# Patient Record
Sex: MALE | Race: BLACK OR AFRICAN AMERICAN | NOT HISPANIC OR LATINO | Employment: UNEMPLOYED | URBAN - METROPOLITAN AREA
[De-identification: names, ages, dates, MRNs, and addresses within clinical notes are randomized per-mention and may not be internally consistent; named-entity substitution may affect disease eponyms.]

---

## 2022-04-09 ENCOUNTER — HISTORICAL (OUTPATIENT)
Dept: ADMINISTRATIVE | Facility: HOSPITAL | Age: 22
End: 2022-04-09

## 2022-04-27 ENCOUNTER — HISTORICAL (OUTPATIENT)
Dept: ADMINISTRATIVE | Facility: HOSPITAL | Age: 22
End: 2022-04-27

## 2022-04-27 VITALS — SYSTOLIC BLOOD PRESSURE: 111 MMHG | DIASTOLIC BLOOD PRESSURE: 68 MMHG

## 2023-04-22 ENCOUNTER — HOSPITAL ENCOUNTER (EMERGENCY)
Facility: HOSPITAL | Age: 23
Discharge: HOME | End: 2023-04-22
Attending: INTERNAL MEDICINE
Payer: MEDICAID

## 2023-04-22 VITALS
RESPIRATION RATE: 15 BRPM | OXYGEN SATURATION: 99 % | SYSTOLIC BLOOD PRESSURE: 106 MMHG | WEIGHT: 160 LBS | BODY MASS INDEX: 21.67 KG/M2 | HEART RATE: 68 BPM | HEIGHT: 72 IN | TEMPERATURE: 98 F | DIASTOLIC BLOOD PRESSURE: 69 MMHG

## 2023-04-22 DIAGNOSIS — R07.89 ATYPICAL CHEST PAIN: ICD-10-CM

## 2023-04-22 DIAGNOSIS — R10.13 EPIGASTRIC ABDOMINAL PAIN: Primary | ICD-10-CM

## 2023-04-22 DIAGNOSIS — R07.9 CHEST PAIN: ICD-10-CM

## 2023-04-22 LAB
ALBUMIN SERPL-MCNC: 4.7 G/DL (ref 3.5–5)
ALBUMIN/GLOB SERPL: 1.6 RATIO (ref 1.1–2)
ALP SERPL-CCNC: 62 UNIT/L (ref 40–150)
ALT SERPL-CCNC: 16 UNIT/L (ref 0–55)
AMYLASE SERPL-CCNC: 97 UNIT/L (ref 25–125)
APPEARANCE UR: CLEAR
AST SERPL-CCNC: 22 UNIT/L (ref 5–34)
BACTERIA #/AREA URNS AUTO: ABNORMAL /HPF
BASOPHILS # BLD AUTO: 0.04 X10(3)/MCL (ref 0–0.2)
BASOPHILS NFR BLD AUTO: 0.2 %
BILIRUB UR QL STRIP.AUTO: NEGATIVE MG/DL
BILIRUBIN DIRECT+TOT PNL SERPL-MCNC: 1.3 MG/DL
BUN SERPL-MCNC: 11.8 MG/DL (ref 8.9–20.6)
CALCIUM SERPL-MCNC: 9.9 MG/DL (ref 8.4–10.2)
CHLORIDE SERPL-SCNC: 106 MMOL/L (ref 98–107)
CO2 SERPL-SCNC: 22 MMOL/L (ref 22–29)
COLOR UR AUTO: COLORLESS
CREAT SERPL-MCNC: 0.87 MG/DL (ref 0.73–1.18)
D DIMER PPP IA.FEU-MCNC: <0.27 UG/ML FEU (ref 0–0.5)
EOSINOPHIL # BLD AUTO: 0 X10(3)/MCL (ref 0–0.9)
EOSINOPHIL NFR BLD AUTO: 0 %
ERYTHROCYTE [DISTWIDTH] IN BLOOD BY AUTOMATED COUNT: 11.5 % (ref 11.5–17)
GFR SERPLBLD CREATININE-BSD FMLA CKD-EPI: >60 MLS/MIN/1.73/M2
GLOBULIN SER-MCNC: 2.9 GM/DL (ref 2.4–3.5)
GLUCOSE SERPL-MCNC: 135 MG/DL (ref 74–100)
GLUCOSE UR QL STRIP.AUTO: NORMAL MG/DL
HCT VFR BLD AUTO: 39.8 % (ref 42–52)
HGB BLD-MCNC: 14.7 G/DL (ref 14–18)
HYALINE CASTS #/AREA URNS LPF: ABNORMAL /LPF
IMM GRANULOCYTES # BLD AUTO: 0.14 X10(3)/MCL (ref 0–0.04)
IMM GRANULOCYTES NFR BLD AUTO: 0.7 %
KETONES UR QL STRIP.AUTO: ABNORMAL MG/DL
LEUKOCYTE ESTERASE UR QL STRIP.AUTO: NEGATIVE UNIT/L
LIPASE SERPL-CCNC: 10 U/L
LYMPHOCYTES # BLD AUTO: 0.99 X10(3)/MCL (ref 0.6–4.6)
LYMPHOCYTES NFR BLD AUTO: 4.8 %
MCH RBC QN AUTO: 32.2 PG (ref 27–31)
MCHC RBC AUTO-ENTMCNC: 36.9 G/DL (ref 33–36)
MCV RBC AUTO: 87.3 FL (ref 80–94)
MONOCYTES # BLD AUTO: 1.08 X10(3)/MCL (ref 0.1–1.3)
MONOCYTES NFR BLD AUTO: 5.3 %
MUCOUS THREADS URNS QL MICRO: ABNORMAL /LPF
NEUTROPHILS # BLD AUTO: 18.28 X10(3)/MCL (ref 2.1–9.2)
NEUTROPHILS NFR BLD AUTO: 89 %
NITRITE UR QL STRIP.AUTO: NEGATIVE
NRBC BLD AUTO-RTO: 0 %
PH UR STRIP.AUTO: 8.5 [PH]
PLATELET # BLD AUTO: 238 X10(3)/MCL (ref 130–400)
PMV BLD AUTO: 9.7 FL (ref 7.4–10.4)
POTASSIUM SERPL-SCNC: 4.2 MMOL/L (ref 3.5–5.1)
PROT SERPL-MCNC: 7.6 GM/DL (ref 6.4–8.3)
PROT UR QL STRIP.AUTO: ABNORMAL MG/DL
RBC # BLD AUTO: 4.56 X10(6)/MCL (ref 4.7–6.1)
RBC #/AREA URNS AUTO: ABNORMAL /HPF
RBC UR QL AUTO: NEGATIVE UNIT/L
SODIUM SERPL-SCNC: 140 MMOL/L (ref 136–145)
SP GR UR STRIP.AUTO: >1.05
SQUAMOUS #/AREA URNS LPF: ABNORMAL /HPF
TROPONIN I SERPL-MCNC: <0.01 NG/ML (ref 0–0.04)
TROPONIN I SERPL-MCNC: <0.01 NG/ML (ref 0–0.04)
UROBILINOGEN UR STRIP-ACNC: NORMAL MG/DL
WBC # SPEC AUTO: 20.5 X10(3)/MCL (ref 4.5–11.5)
WBC #/AREA URNS AUTO: ABNORMAL /HPF

## 2023-04-22 PROCEDURE — 85610 PROTHROMBIN TIME: CPT | Performed by: PHYSICIAN ASSISTANT

## 2023-04-22 PROCEDURE — 63600175 PHARM REV CODE 636 W HCPCS: Performed by: PHYSICIAN ASSISTANT

## 2023-04-22 PROCEDURE — 80053 COMPREHEN METABOLIC PANEL: CPT | Performed by: PHYSICIAN ASSISTANT

## 2023-04-22 PROCEDURE — 85025 COMPLETE CBC W/AUTO DIFF WBC: CPT | Performed by: PHYSICIAN ASSISTANT

## 2023-04-22 PROCEDURE — 25000003 PHARM REV CODE 250: Performed by: PHYSICIAN ASSISTANT

## 2023-04-22 PROCEDURE — 96361 HYDRATE IV INFUSION ADD-ON: CPT

## 2023-04-22 PROCEDURE — 25500020 PHARM REV CODE 255

## 2023-04-22 PROCEDURE — 84484 ASSAY OF TROPONIN QUANT: CPT | Performed by: PHYSICIAN ASSISTANT

## 2023-04-22 PROCEDURE — 96374 THER/PROPH/DIAG INJ IV PUSH: CPT | Mod: 59

## 2023-04-22 PROCEDURE — 93005 ELECTROCARDIOGRAM TRACING: CPT

## 2023-04-22 PROCEDURE — 99285 EMERGENCY DEPT VISIT HI MDM: CPT | Mod: 25

## 2023-04-22 PROCEDURE — 96375 TX/PRO/DX INJ NEW DRUG ADDON: CPT

## 2023-04-22 PROCEDURE — 81001 URINALYSIS AUTO W/SCOPE: CPT | Performed by: PHYSICIAN ASSISTANT

## 2023-04-22 PROCEDURE — 85379 FIBRIN DEGRADATION QUANT: CPT | Performed by: PHYSICIAN ASSISTANT

## 2023-04-22 PROCEDURE — 83690 ASSAY OF LIPASE: CPT | Performed by: PHYSICIAN ASSISTANT

## 2023-04-22 PROCEDURE — 82150 ASSAY OF AMYLASE: CPT | Performed by: PHYSICIAN ASSISTANT

## 2023-04-22 RX ORDER — ONDANSETRON 4 MG/1
4 TABLET, ORALLY DISINTEGRATING ORAL EVERY 8 HOURS PRN
Qty: 9 TABLET | Refills: 0 | Status: SHIPPED | OUTPATIENT
Start: 2023-04-22 | End: 2023-04-25

## 2023-04-22 RX ORDER — MORPHINE SULFATE 2 MG/ML
6 INJECTION, SOLUTION INTRAMUSCULAR; INTRAVENOUS
Status: COMPLETED | OUTPATIENT
Start: 2023-04-22 | End: 2023-04-22

## 2023-04-22 RX ORDER — KETOROLAC TROMETHAMINE 30 MG/ML
15 INJECTION, SOLUTION INTRAMUSCULAR; INTRAVENOUS
Status: COMPLETED | OUTPATIENT
Start: 2023-04-22 | End: 2023-04-22

## 2023-04-22 RX ORDER — METOCLOPRAMIDE HYDROCHLORIDE 5 MG/ML
10 INJECTION INTRAMUSCULAR; INTRAVENOUS
Status: COMPLETED | OUTPATIENT
Start: 2023-04-22 | End: 2023-04-22

## 2023-04-22 RX ORDER — ONDANSETRON 2 MG/ML
4 INJECTION INTRAMUSCULAR; INTRAVENOUS
Status: COMPLETED | OUTPATIENT
Start: 2023-04-22 | End: 2023-04-22

## 2023-04-22 RX ADMIN — MORPHINE SULFATE 6 MG: 2 INJECTION, SOLUTION INTRAMUSCULAR; INTRAVENOUS at 04:04

## 2023-04-22 RX ADMIN — KETOROLAC TROMETHAMINE 15 MG: 30 INJECTION, SOLUTION INTRAMUSCULAR; INTRAVENOUS at 03:04

## 2023-04-22 RX ADMIN — ONDANSETRON 4 MG: 2 INJECTION INTRAMUSCULAR; INTRAVENOUS at 03:04

## 2023-04-22 RX ADMIN — IOHEXOL 100 ML: 350 INJECTION, SOLUTION INTRAVENOUS at 04:04

## 2023-04-22 RX ADMIN — SODIUM CHLORIDE 1000 ML: 9 INJECTION, SOLUTION INTRAVENOUS at 03:04

## 2023-04-22 RX ADMIN — METOCLOPRAMIDE 10 MG: 5 INJECTION, SOLUTION INTRAMUSCULAR; INTRAVENOUS at 04:04

## 2023-04-22 RX ADMIN — SODIUM CHLORIDE 1000 ML: 9 INJECTION, SOLUTION INTRAVENOUS at 06:04
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
## 2023-06-13 ENCOUNTER — HOSPITAL ENCOUNTER (EMERGENCY)
Facility: HOSPITAL | Age: 23
Discharge: HOME | End: 2023-06-13
Attending: INTERNAL MEDICINE
Payer: MEDICAID

## 2023-06-13 VITALS
OXYGEN SATURATION: 99 % | BODY MASS INDEX: 20.16 KG/M2 | HEART RATE: 111 BPM | SYSTOLIC BLOOD PRESSURE: 138 MMHG | TEMPERATURE: 101 F | WEIGHT: 152.13 LBS | HEIGHT: 73 IN | RESPIRATION RATE: 16 BRPM | DIASTOLIC BLOOD PRESSURE: 73 MMHG

## 2023-06-13 DIAGNOSIS — J03.90 TONSILLITIS: Primary | ICD-10-CM

## 2023-06-13 LAB — STREP A PCR (OHS): NOT DETECTED

## 2023-06-13 PROCEDURE — 99284 EMERGENCY DEPT VISIT MOD MDM: CPT

## 2023-06-13 PROCEDURE — 87651 STREP A DNA AMP PROBE: CPT | Performed by: NURSE PRACTITIONER

## 2023-06-13 PROCEDURE — 25000003 PHARM REV CODE 250: Performed by: NURSE PRACTITIONER

## 2023-06-13 RX ORDER — DICLOFENAC SODIUM 75 MG/1
75 TABLET, DELAYED RELEASE ORAL 2 TIMES DAILY
Qty: 14 TABLET | Refills: 0 | Status: SHIPPED | OUTPATIENT
Start: 2023-06-13 | End: 2023-06-20

## 2023-06-13 RX ORDER — AMOXICILLIN 500 MG/1
1000 CAPSULE ORAL DAILY
Qty: 20 CAPSULE | Refills: 0 | Status: SHIPPED | OUTPATIENT
Start: 2023-06-13 | End: 2023-06-23

## 2023-06-13 RX ORDER — ACETAMINOPHEN 500 MG
1000 TABLET ORAL
Status: COMPLETED | OUTPATIENT
Start: 2023-06-13 | End: 2023-06-13

## 2023-06-13 RX ADMIN — ACETAMINOPHEN 1000 MG: 500 TABLET, FILM COATED ORAL at 05:06

## 2023-06-13 NOTE — DISCHARGE INSTRUCTIONS
Take tylenol or motrin every 6-8 hours for fever of 101 or greater.  Take medication as prescribed.  Follow up with your primary care physician in 3-5 days for follow up evaluation.  Warm salt water gargle 3 times daily for symptoms relief.

## 2023-06-13 NOTE — ED PROVIDER NOTES
Encounter Date: 6/13/2023       History     Chief Complaint   Patient presents with    Sore Throat     SORE THROAT W FEVER X 3 DAYS.       Pt is a 22 y.o. male who presents to the [facility] ED complaining of a sore throat and fever. Symptoms x 3 days. Denies chest pain, SOB, weakness, dizziness, abdominal pain, or loss of bowel or bladder control. Denies chronic medical conditions.     Review of patient's allergies indicates:  No Known Allergies  History reviewed. No pertinent past medical history.  History reviewed. No pertinent surgical history.  History reviewed. No pertinent family history.  Social History     Tobacco Use    Smoking status: Every Day     Types: Vaping with nicotine    Smokeless tobacco: Never   Substance Use Topics    Drug use: Yes     Types: Marijuana     Review of Systems   Constitutional:  Positive for fever. Negative for chills, diaphoresis and fatigue.   HENT:  Positive for sore throat. Negative for facial swelling, postnasal drip, rhinorrhea, sinus pressure, sinus pain and trouble swallowing.    Respiratory:  Negative for cough, chest tightness, shortness of breath and wheezing.    Cardiovascular:  Negative for chest pain, palpitations and leg swelling.   Gastrointestinal:  Negative for abdominal pain, diarrhea, nausea and vomiting.   Genitourinary:  Negative for dysuria, flank pain, hematuria and urgency.   Musculoskeletal:  Negative for arthralgias, back pain and myalgias.   Skin:  Negative for color change and rash.   Neurological:  Negative for dizziness, syncope, weakness and headaches.   Hematological:  Does not bruise/bleed easily.   All other systems reviewed and are negative.    Physical Exam     Initial Vitals [06/13/23 1723]   BP Pulse Resp Temp SpO2   138/73 (!) 111 16 (!) 101.3 °F (38.5 °C) 99 %      MAP       --         Physical Exam    Nursing note and vitals reviewed.  Constitutional: Vital signs are normal. He appears well-developed and well-nourished.   HENT:   Head:  Normocephalic.   Nose: Nose normal.   Mouth/Throat: Oropharyngeal exudate, posterior oropharyngeal edema and posterior oropharyngeal erythema present. No tonsillar abscesses.   Eyes: Conjunctivae and EOM are normal. Pupils are equal, round, and reactive to light.   Neck: Neck supple.   Normal range of motion.  Cardiovascular:  Normal rate, regular rhythm, normal heart sounds and intact distal pulses.           Pulmonary/Chest: Effort normal and breath sounds normal. No respiratory distress. He has no wheezes. He has no rhonchi. He has no rales. He exhibits no tenderness.   Abdominal: Abdomen is soft and flat. Bowel sounds are normal. There is no abdominal tenderness. There is no rebound, no guarding, no tenderness at McBurney's point and negative Mahan's sign.   Musculoskeletal:         General: Normal range of motion.      Cervical back: Normal range of motion and neck supple.     Neurological: He is alert and oriented to person, place, and time. He has normal strength.   Skin: Skin is warm and dry. Capillary refill takes less than 2 seconds.   Psychiatric: He has a normal mood and affect. His behavior is normal. Judgment and thought content normal.       ED Course   Procedures  Labs Reviewed   STREP GROUP A BY PCR          Imaging Results    None          Medications   acetaminophen tablet 1,000 mg (1,000 mg Oral Given 6/13/23 1738)     Medical Decision Making:   Differential Diagnosis:   Tonsillitis  Strep pharyngitis    Clinical Tests:   Lab Tests: Ordered  ED Management:  5:42 PM Due to pt symptoms, I have a high suspicion of strep pharyngitis vs tonsillitis. I will place pt on medication coverage for suspected infection. Stressed need for him to take tylenol or motrin every 6-8 hours for fever control. He also needs to follow up with his PCP for further evaluation. Pt strep results will be available on [employer] system which I recommended that he check later today to verify result.                           Clinical Impression:   Final diagnoses:  [J03.90] Tonsillitis (Primary)        ED Disposition Condition    Discharge Stable          ED Prescriptions       Medication Sig Dispense Start Date End Date Auth. Provider    diclofenac (VOLTAREN) 75 MG EC tablet Take 1 tablet (75 mg total) by mouth 2 (two) times daily. for 7 days 14 tablet 6/13/2023 6/20/2023 HALLIE Hobson Jr.    amoxicillin (AMOXIL) 500 MG capsule Take 2 capsules (1,000 mg total) by mouth once daily. for 10 days 20 capsule 6/13/2023 6/23/2023 HALLIE Hobson Jr.          Follow-up Information       Follow up With Specialties Details Why Contact Info    [facility] Dept Emergency Medicine In 3 days As needed, If symptoms worsen [address] [facility] [address] [phone]             HALLIE Hobson Jr.  06/13/23 [id number]

## 2023-08-16 LAB
INR PPP: 1.1
PROTHROMBIN TIME: 13.9 SECONDS (ref 11.4–14)

## 2023-10-04 ENCOUNTER — HOSPITAL ENCOUNTER (EMERGENCY)
Facility: HOSPITAL | Age: 23
Discharge: HOME | End: 2023-10-04
Attending: STUDENT IN AN ORGANIZED HEALTH CARE EDUCATION/TRAINING PROGRAM
Payer: MEDICAID

## 2023-10-04 VITALS
WEIGHT: 156.06 LBS | HEART RATE: 99 BPM | OXYGEN SATURATION: 99 % | BODY MASS INDEX: 21.14 KG/M2 | HEIGHT: 72 IN | TEMPERATURE: 99 F | SYSTOLIC BLOOD PRESSURE: 113 MMHG | DIASTOLIC BLOOD PRESSURE: 78 MMHG | RESPIRATION RATE: 17 BRPM

## 2023-10-04 DIAGNOSIS — R07.81 RIB PAIN ON RIGHT SIDE: ICD-10-CM

## 2023-10-04 DIAGNOSIS — J18.9 PNEUMONIA OF RIGHT LOWER LOBE DUE TO INFECTIOUS ORGANISM: Primary | ICD-10-CM

## 2023-10-04 LAB
ALBUMIN SERPL-MCNC: 3.4 G/DL (ref 3.5–5)
ALBUMIN/GLOB SERPL: 0.7 RATIO (ref 1.1–2)
ALP SERPL-CCNC: 127 UNIT/L (ref 40–150)
ALT SERPL-CCNC: 17 UNIT/L (ref 0–55)
APPEARANCE UR: CLEAR
AST SERPL-CCNC: 20 UNIT/L (ref 5–34)
BACTERIA #/AREA URNS AUTO: ABNORMAL /HPF
BASOPHILS # BLD AUTO: 0.03 X10(3)/MCL
BASOPHILS NFR BLD AUTO: 0.2 %
BILIRUB SERPL-MCNC: 1.6 MG/DL
BILIRUB UR QL STRIP.AUTO: NEGATIVE
BUN SERPL-MCNC: 8.4 MG/DL (ref 8.9–20.6)
CALCIUM SERPL-MCNC: 10 MG/DL (ref 8.4–10.2)
CHLORIDE SERPL-SCNC: 97 MMOL/L (ref 98–107)
CO2 SERPL-SCNC: 28 MMOL/L (ref 22–29)
COLOR UR AUTO: YELLOW
CREAT SERPL-MCNC: 0.82 MG/DL (ref 0.73–1.18)
EOSINOPHIL # BLD AUTO: 0.13 X10(3)/MCL (ref 0–0.9)
EOSINOPHIL NFR BLD AUTO: 0.7 %
ERYTHROCYTE [DISTWIDTH] IN BLOOD BY AUTOMATED COUNT: 10.9 % (ref 11.5–17)
GFR SERPLBLD CREATININE-BSD FMLA CKD-EPI: >60 MLS/MIN/1.73/M2
GLOBULIN SER-MCNC: 5.1 GM/DL (ref 2.4–3.5)
GLUCOSE SERPL-MCNC: 86 MG/DL (ref 74–100)
GLUCOSE UR QL STRIP.AUTO: NORMAL
HCT VFR BLD AUTO: 37.9 % (ref 42–52)
HGB BLD-MCNC: 12.9 G/DL (ref 14–18)
HYALINE CASTS #/AREA URNS LPF: ABNORMAL /LPF
IMM GRANULOCYTES # BLD AUTO: 0.1 X10(3)/MCL (ref 0–0.04)
IMM GRANULOCYTES NFR BLD AUTO: 0.6 %
KETONES UR QL STRIP.AUTO: NEGATIVE
LEUKOCYTE ESTERASE UR QL STRIP.AUTO: NEGATIVE
LYMPHOCYTES # BLD AUTO: 1.85 X10(3)/MCL (ref 0.6–4.6)
LYMPHOCYTES NFR BLD AUTO: 10.4 %
MCH RBC QN AUTO: 29.8 PG (ref 27–31)
MCHC RBC AUTO-ENTMCNC: 34 G/DL (ref 33–36)
MCV RBC AUTO: 87.5 FL (ref 80–94)
MONOCYTES # BLD AUTO: 1.35 X10(3)/MCL (ref 0.1–1.3)
MONOCYTES NFR BLD AUTO: 7.6 %
MUCOUS THREADS URNS QL MICRO: ABNORMAL /LPF
NEUTROPHILS # BLD AUTO: 14.41 X10(3)/MCL (ref 2.1–9.2)
NEUTROPHILS NFR BLD AUTO: 80.5 %
NITRITE UR QL STRIP.AUTO: NEGATIVE
NRBC BLD AUTO-RTO: 0 %
PH UR STRIP.AUTO: 6.5 [PH]
PLATELET # BLD AUTO: 450 X10(3)/MCL (ref 130–400)
PMV BLD AUTO: 9.1 FL (ref 7.4–10.4)
POTASSIUM SERPL-SCNC: 3.9 MMOL/L (ref 3.5–5.1)
PROT SERPL-MCNC: 8.5 GM/DL (ref 6.4–8.3)
PROT UR QL STRIP.AUTO: ABNORMAL
RBC # BLD AUTO: 4.33 X10(6)/MCL (ref 4.7–6.1)
RBC #/AREA URNS AUTO: ABNORMAL /HPF
RBC UR QL AUTO: NEGATIVE
SODIUM SERPL-SCNC: 134 MMOL/L (ref 136–145)
SP GR UR STRIP.AUTO: 1.02 (ref 1–1.03)
SQUAMOUS #/AREA URNS LPF: ABNORMAL /HPF
UROBILINOGEN UR STRIP-ACNC: 4
WBC # SPEC AUTO: 17.87 X10(3)/MCL (ref 4.5–11.5)
WBC #/AREA URNS AUTO: ABNORMAL /HPF

## 2023-10-04 PROCEDURE — 99285 EMERGENCY DEPT VISIT HI MDM: CPT | Mod: 25

## 2023-10-04 PROCEDURE — 80053 COMPREHEN METABOLIC PANEL: CPT | Performed by: NURSE PRACTITIONER

## 2023-10-04 PROCEDURE — 96360 HYDRATION IV INFUSION INIT: CPT

## 2023-10-04 PROCEDURE — 85025 COMPLETE CBC W/AUTO DIFF WBC: CPT | Performed by: NURSE PRACTITIONER

## 2023-10-04 PROCEDURE — 81001 URINALYSIS AUTO W/SCOPE: CPT | Performed by: NURSE PRACTITIONER

## 2023-10-04 PROCEDURE — 25000003 PHARM REV CODE 250: Performed by: NURSE PRACTITIONER

## 2023-10-04 PROCEDURE — 93005 ELECTROCARDIOGRAM TRACING: CPT

## 2023-10-04 RX ORDER — LEVOFLOXACIN 750 MG/1
750 TABLET ORAL DAILY
Qty: 7 TABLET | Refills: 0 | Status: SHIPPED | OUTPATIENT
Start: 2023-10-04 | End: 2023-10-11

## 2023-10-04 RX ORDER — ALBUTEROL SULFATE 90 UG/1
1-2 AEROSOL, METERED RESPIRATORY (INHALATION) EVERY 6 HOURS PRN
Qty: 8 G | Refills: 0 | Status: ON HOLD | OUTPATIENT
Start: 2023-10-04 | End: 2023-10-10 | Stop reason: HOSPADM

## 2023-10-04 RX ORDER — METHYLPREDNISOLONE 4 MG/1
TABLET ORAL
Qty: 21 EACH | Refills: 0 | Status: ON HOLD | OUTPATIENT
Start: 2023-10-04 | End: 2023-10-10 | Stop reason: HOSPADM

## 2023-10-04 RX ORDER — BENZONATATE 100 MG/1
200 CAPSULE ORAL 3 TIMES DAILY PRN
Qty: 30 CAPSULE | Refills: 0 | Status: ON HOLD | OUTPATIENT
Start: 2023-10-04 | End: 2023-10-10 | Stop reason: HOSPADM

## 2023-10-04 RX ADMIN — SODIUM CHLORIDE 1000 ML: 9 INJECTION, SOLUTION INTRAVENOUS at 04:10
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
## 2023-10-04 NOTE — DISCHARGE INSTRUCTIONS
Follow up with your primary care physician in 3-5 days for follow up evaluation.  Take medication as prescribed.  Return to the [facility] ED immediately for onset of chest pain, SOB, or fever.

## 2023-10-06 ENCOUNTER — HOSPITAL ENCOUNTER (INPATIENT)
Facility: HOSPITAL | Age: 23
LOS: 4 days | Discharge: HOME | DRG: 194 | End: 2023-10-10
Attending: EMERGENCY MEDICINE | Admitting: STUDENT IN AN ORGANIZED HEALTH CARE EDUCATION/TRAINING PROGRAM
Payer: MEDICAID

## 2023-10-06 DIAGNOSIS — J18.9 PNEUMONIA: ICD-10-CM

## 2023-10-06 DIAGNOSIS — J86.9 EMPYEMA OF RIGHT PLEURAL SPACE: Primary | ICD-10-CM

## 2023-10-06 DIAGNOSIS — J18.9 PNEUMONIA OF RIGHT LOWER LOBE DUE TO INFECTIOUS ORGANISM: ICD-10-CM

## 2023-10-06 DIAGNOSIS — R04.2 HEMOPTYSIS: ICD-10-CM

## 2023-10-06 LAB
ALBUMIN SERPL-MCNC: 3.2 G/DL (ref 3.5–5)
ALBUMIN/GLOB SERPL: 0.6 RATIO (ref 1.1–2)
ALP SERPL-CCNC: 138 UNIT/L (ref 40–150)
ALT SERPL-CCNC: 28 UNIT/L (ref 0–55)
AST SERPL-CCNC: 32 UNIT/L (ref 5–34)
BASOPHILS # BLD AUTO: 0.02 X10(3)/MCL
BASOPHILS NFR BLD AUTO: 0.1 %
BILIRUB SERPL-MCNC: 1 MG/DL
BUN SERPL-MCNC: 10 MG/DL (ref 8.9–20.6)
CALCIUM SERPL-MCNC: 10 MG/DL (ref 8.4–10.2)
CHLORIDE SERPL-SCNC: 98 MMOL/L (ref 98–107)
CO2 SERPL-SCNC: 29 MMOL/L (ref 22–29)
CREAT SERPL-MCNC: 0.73 MG/DL (ref 0.73–1.18)
D DIMER PPP IA.FEU-MCNC: 1.18 UG/ML FEU (ref 0–0.5)
EOSINOPHIL # BLD AUTO: 0.01 X10(3)/MCL (ref 0–0.9)
EOSINOPHIL NFR BLD AUTO: 0 %
ERYTHROCYTE [DISTWIDTH] IN BLOOD BY AUTOMATED COUNT: 11.1 % (ref 11.5–17)
GFR SERPLBLD CREATININE-BSD FMLA CKD-EPI: >60 MLS/MIN/1.73/M2
GLOBULIN SER-MCNC: 5.3 GM/DL (ref 2.4–3.5)
GLUCOSE SERPL-MCNC: 112 MG/DL (ref 74–100)
HAV IGM SERPL QL IA: NONREACTIVE
HBV CORE IGM SERPL QL IA: NONREACTIVE
HBV SURFACE AG SERPL QL IA: NONREACTIVE
HCT VFR BLD AUTO: 37.1 % (ref 42–52)
HCV AB SERPL QL IA: NONREACTIVE
HGB BLD-MCNC: 12.5 G/DL (ref 14–18)
HIV 1+2 AB+HIV1 P24 AG SERPL QL IA: NONREACTIVE
IMM GRANULOCYTES # BLD AUTO: 0.22 X10(3)/MCL (ref 0–0.04)
IMM GRANULOCYTES NFR BLD AUTO: 1 %
LACTATE SERPL-SCNC: 0.9 MMOL/L (ref 0.5–2.2)
LYMPHOCYTES # BLD AUTO: 1 X10(3)/MCL (ref 0.6–4.6)
LYMPHOCYTES NFR BLD AUTO: 4.6 %
MCH RBC QN AUTO: 29.3 PG (ref 27–31)
MCHC RBC AUTO-ENTMCNC: 33.7 G/DL (ref 33–36)
MCV RBC AUTO: 87.1 FL (ref 80–94)
MONOCYTES # BLD AUTO: 1.07 X10(3)/MCL (ref 0.1–1.3)
MONOCYTES NFR BLD AUTO: 4.9 %
NEUTROPHILS # BLD AUTO: 19.63 X10(3)/MCL (ref 2.1–9.2)
NEUTROPHILS NFR BLD AUTO: 89.4 %
NRBC BLD AUTO-RTO: 0 %
PLATELET # BLD AUTO: 523 X10(3)/MCL (ref 130–400)
PMV BLD AUTO: 8.9 FL (ref 7.4–10.4)
POTASSIUM SERPL-SCNC: 4.8 MMOL/L (ref 3.5–5.1)
PROT SERPL-MCNC: 8.5 GM/DL (ref 6.4–8.3)
RBC # BLD AUTO: 4.26 X10(6)/MCL (ref 4.7–6.1)
SODIUM SERPL-SCNC: 135 MMOL/L (ref 136–145)
T PALLIDUM AB SER QL: NONREACTIVE
WBC # SPEC AUTO: 21.95 X10(3)/MCL (ref 4.5–11.5)

## 2023-10-06 PROCEDURE — 87040 BLOOD CULTURE FOR BACTERIA: CPT | Performed by: PHYSICIAN ASSISTANT

## 2023-10-06 PROCEDURE — 63600175 PHARM REV CODE 636 W HCPCS: Performed by: EMERGENCY MEDICINE

## 2023-10-06 PROCEDURE — 80053 COMPREHEN METABOLIC PANEL: CPT | Performed by: PHYSICIAN ASSISTANT

## 2023-10-06 PROCEDURE — 87389 HIV-1 AG W/HIV-1&-2 AB AG IA: CPT

## 2023-10-06 PROCEDURE — 99285 EMERGENCY DEPT VISIT HI MDM: CPT | Mod: 25

## 2023-10-06 PROCEDURE — 96365 THER/PROPH/DIAG IV INF INIT: CPT

## 2023-10-06 PROCEDURE — 86780 TREPONEMA PALLIDUM: CPT

## 2023-10-06 PROCEDURE — 96375 TX/PRO/DX INJ NEW DRUG ADDON: CPT

## 2023-10-06 PROCEDURE — 63600175 PHARM REV CODE 636 W HCPCS

## 2023-10-06 PROCEDURE — 83605 ASSAY OF LACTIC ACID: CPT | Performed by: PHYSICIAN ASSISTANT

## 2023-10-06 PROCEDURE — 25500020 PHARM REV CODE 255

## 2023-10-06 PROCEDURE — 85379 FIBRIN DEGRADATION QUANT: CPT | Performed by: PHYSICIAN ASSISTANT

## 2023-10-06 PROCEDURE — 25000003 PHARM REV CODE 250

## 2023-10-06 PROCEDURE — 85025 COMPLETE CBC W/AUTO DIFF WBC: CPT | Performed by: PHYSICIAN ASSISTANT

## 2023-10-06 PROCEDURE — 80074 ACUTE HEPATITIS PANEL: CPT

## 2023-10-06 PROCEDURE — 87070 CULTURE OTHR SPECIMN AEROBIC: CPT

## 2023-10-06 PROCEDURE — 25000003 PHARM REV CODE 250: Performed by: EMERGENCY MEDICINE

## 2023-10-06 PROCEDURE — 11000001 HC ACUTE MED/SURG PRIVATE ROOM

## 2023-10-06 RX ORDER — SODIUM CHLORIDE 0.9 % (FLUSH) 0.9 %
10 SYRINGE (ML) INJECTION EVERY 12 HOURS PRN
Status: DISCONTINUED | OUTPATIENT
Start: 2023-10-06 | End: 2023-10-10 | Stop reason: HOSPADM

## 2023-10-06 RX ORDER — BENZONATATE 100 MG/1
200 CAPSULE ORAL 3 TIMES DAILY PRN
Status: DISCONTINUED | OUTPATIENT
Start: 2023-10-06 | End: 2023-10-10 | Stop reason: HOSPADM

## 2023-10-06 RX ORDER — TALC
6 POWDER (GRAM) TOPICAL NIGHTLY PRN
Status: DISCONTINUED | OUTPATIENT
Start: 2023-10-06 | End: 2023-10-10 | Stop reason: HOSPADM

## 2023-10-06 RX ORDER — ALBUTEROL SULFATE 90 UG/1
2 AEROSOL, METERED RESPIRATORY (INHALATION) EVERY 6 HOURS PRN
Status: DISCONTINUED | OUTPATIENT
Start: 2023-10-06 | End: 2023-10-10 | Stop reason: HOSPADM

## 2023-10-06 RX ORDER — ENOXAPARIN SODIUM 100 MG/ML
40 INJECTION SUBCUTANEOUS EVERY 24 HOURS
Status: DISCONTINUED | OUTPATIENT
Start: 2023-10-06 | End: 2023-10-10 | Stop reason: HOSPADM

## 2023-10-06 RX ORDER — ACETAMINOPHEN 325 MG/1
650 TABLET ORAL EVERY 4 HOURS PRN
Status: DISCONTINUED | OUTPATIENT
Start: 2023-10-06 | End: 2023-10-10 | Stop reason: HOSPADM

## 2023-10-06 RX ORDER — METRONIDAZOLE 500 MG/100ML
500 INJECTION, SOLUTION INTRAVENOUS
Status: DISCONTINUED | OUTPATIENT
Start: 2023-10-06 | End: 2023-10-07

## 2023-10-06 RX ORDER — LEVOFLOXACIN 5 MG/ML
750 INJECTION, SOLUTION INTRAVENOUS
Status: DISCONTINUED | OUTPATIENT
Start: 2023-10-06 | End: 2023-10-07

## 2023-10-06 RX ADMIN — IOHEXOL 100 ML: 350 INJECTION, SOLUTION INTRAVENOUS at 04:10

## 2023-10-06 RX ADMIN — LEVOFLOXACIN 750 MG: 750 INJECTION, SOLUTION INTRAVENOUS at 05:10

## 2023-10-06 RX ADMIN — SODIUM CHLORIDE 1000 ML: 9 INJECTION, SOLUTION INTRAVENOUS at 05:10

## 2023-10-06 RX ADMIN — DEXTROSE MONOHYDRATE 1 G: 5 INJECTION INTRAVENOUS at 07:10

## 2023-10-06 RX ADMIN — METRONIDAZOLE 500 MG: 500 INJECTION, SOLUTION INTRAVENOUS at 06:10

## 2023-10-06 RX ADMIN — ENOXAPARIN SODIUM 40 MG: 40 INJECTION SUBCUTANEOUS at 07:10
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
## 2023-10-06 NOTE — MEDICAL/APP STUDENT
[facility] -  History & Physical    SUBJECTIVE:     Chief Complaint/Reason for Admission: Continued cough and hemoptysis     History of Present Illness:  Patient is a 22 y.o. male presents  to [facility] ED with persistent cough and frequent episodes of hemoptysis that began today. He was evaluated and treated for right sided pneumonia 10/4/23 at [facility] ED and taking all prescribed medicines. Patient reporrts that hemoptysis was initially bright red and is now brown in color. Patient states that he is also experiencing right rib pain at this time and pain with deep inspiration. He reports experiencing fever, chills and night sweats prior to presenting to [facility] ED. Denies SOB or chest pain at this time. Imaging was obtained and CRX revealed right basilar opacities and associated pleural effusion, CTA chest w/o contrast revealed right lower lung lobe is remarkable for large consolidation with associated necrosis and mild cavitations, right hilar enlargement. Afebrile, WBC elevated, 21.95, D-dimer elevated 1.18.  Patient has been admitted to hospitalist medicine for further workup of hemoptysis and management of RLL pneumonia and pleural effusion.            Social History     Tobacco Use    Smoking status: Every Day     Types: Vaping with nicotine    Smokeless tobacco: Never   Substance Use Topics    Drug use: Yes     Types: Marijuana        Review of Systems:  Constitutional: no fever or chills, positive for chills, fevers, and night sweats  Eyes: no visual changes  ENT: no nasal congestion or sore throat, positive for nasal congestion  Respiratory: no cough or shortness of breath, positive for cough, hemoptysis, pleurisy/chest pain, and sputum  Cardiovascular: no chest pain or palpitations  Gastrointestinal: no nausea or vomiting, no abdominal pain or change in bowel habits  Hematologic/Lymphatic: no easy bruising or lymphadenopathy  Musculoskeletal: no arthralgias or myalgias  Neurological: no seizures or  tremors    OBJECTIVE:     Vital Signs (Most Recent):  Temp: 97.7 °F (36.5 °C) (10/06/23 1348)  Pulse: 91 (10/06/23 1348)  Resp: 18 (10/06/23 1348)  BP: 123/71 (10/06/23 1348)  SpO2: 98 % (10/06/23 1348)    Physical Exam:  General: well developed, well nourished  Eyes: conjunctivae/corneas clear. PERRL.   Neck: supple, symmetrical, trachea midline, no JVD and thyroid not enlarged, symmetric, no tenderness/mass/nodules  Lungs:  normal respiratory effort and diminished breath sounds RLL  Cardiovascular: Heart: regular rate and rhythm, S1, S2 normal, no murmur, click, rub or gallop. Chest Wall: no tenderness. Extremities: no cyanosis or edema, or clubbing. Pulses: 2+ and symmetric.    Laboratory:  I have reviewed all pertinent lab results within the past 24 hours.  CBC:   Recent Labs   Lab 10/06/23  1444   WBC 21.95*   RBC 4.26*   HGB 12.5*   HCT 37.1*   *   MCV 87.1   MCH 29.3   MCHC 33.7     BMP:   Recent Labs   Lab 10/06/23  1444   *   K 4.8   CO2 29   BUN 10.0   CREATININE 0.73   CALCIUM 10.0     CMP:   Recent Labs   Lab 10/06/23  1444   CALCIUM 10.0   ALBUMIN 3.2*   *   K 4.8   CO2 29   BUN 10.0   CREATININE 0.73   ALKPHOS 138   ALT 28   AST 32   BILITOT 1.0     LFTs:   Recent Labs   Lab 10/06/23  1444   ALT 28   AST 32   ALKPHOS 138   BILITOT 1.0   ALBUMIN 3.2*     Coagulation:   Recent Labs   Lab 10/06/23  1444   LABPROT 8.5*     Microbiology Results (last 7 days)       Procedure Component Value Units Date/Time    Respiratory Culture [[id number]]     Order Status: Sent Specimen: Respiratory from Sputum, Expectorated     Blood culture #1 **CANNOT BE ORDERED STAT** [[id number]] Collected: 10/06/23 1732    Order Status: Sent Specimen: Blood from Arm, Right Updated: 10/06/23 1737    Blood culture #2 **CANNOT BE ORDERED STAT** [[id number]] Collected: 10/06/23 1732    Order Status: Sent Specimen: Blood from Forearm, Right Updated: 10/06/23 1737          Specimen (24h ago, onward)      None           Recent Labs   Lab 10/04/23  1609   PROTEINUA Trace*   BACTERIA None Seen   LEUKOCYTESUR Negative   UROBILINOGEN +4*   HYALINECASTS None Seen       Diagnostic Results:  Chest Xray:PA and Lateral  FINDINGS:  Cardiopericardial silhouette is within normal limits.  Partially improved right basilar opacities and associated pleural effusion.  Lungs otherwise are well expanded and clear..     Impression:     Partially improved right basilar lower lung lobe infiltrates and associated pleural effusion.        Electronically signed by: Joe Mendez  Date:                                            10/06/2023  Time:                                           14:53    CTA Chest Non-Contrast:  CTA CHEST NON CORONARY (PE STUDIES)     CLINICAL HISTORY:  Pulmonary embolism (PE) suspected, positive D-dimer;     TECHNIQUE:  Sequential axial images performed of the chest using pulmonary embolism protocol following intravenous contrast bolus. Sagittal and contrast reformations performed.     Dose product length of 139 mGycm. Automated exposure control was utilized to minimize radiation dose.     COMPARISON:  Chest radiograph October 6, 2023     FINDINGS:  Optimal contrast bolus timing with adequately opacified pulmonary artery system is without evidence of filling defects from pulmonary thromboembolism within the main pulmonary artery and the major branches. Thoracic aorta maintains unremarkable course and diameter.     Right lower lung lobe is remarkable for large consolidation with associated necrosis and mild cavitations and approximately measures 4.5 x 8.5 cm on image 287 series 10.  lungs are well expanded and clear.  There is no pneumonitis or pulmonary edema.  There is right hilar enlarged lymph node which measures 2.0 cm.  No significant fluid within the pleural or the pericardial spaces.     Impression:     1.  No pulmonary embolism identified.     2.  Right lower lung lobe large consolidation with associated necrosis and  cavitations.        Electronically signed by: Joe Mendez  Date:                                            10/06/2023  Time:                                           16:49  ASSESSMENT/PLAN:   Plan:      Pneumonia with RLL consolidation:  WBC: 21.95  Lactic Acid: WNL  D-Dimer 1.18  Respiratory culture ordered  Blood culture ordered x2  Start Rocephin 1g IM QDay  Start Levofloxacin 750mg/150mL Qday  Continue Metronidazole 500mg IV TID   Albuterol Inhaler 2 puffs QID PRN     Hemoptysis:  Tessalon Pearls 200 PRN cough     Pleural Effusion:   Possibly secondary to pneumonia or liver disease. Patient with enlarged liver on imaging and admits to persistent alcohol use, obtaining additional labs for further workup.  Albumin low, 3.2  4+ Urobilinogen and Trace protein on UA    Pending Hep Panel, RPR, HIV    Recheck CBC, CMP in AM    DVT Prophylaxis: enoxaparin 40mg IM   Incentive Spirometry
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
## 2023-10-06 NOTE — LETTER
October 10, 2023                 [facility]  [address] [place] [address] October 10, 2023     Patient: Priyank Fisher Jr.   YOB: 2000       To Whom It May Concern:    Priyank Fisher was admitted to the hospital on 10/6/2023  2:28 PM and discharged on 10/10/2023 .  He may return to work on 10/15/2023 .  If you have any questions or concerns, please don't hesitate to call Dr. Salas's  office at [phone].      Sincerely,        Yojana Fisher, RN  Medical/Surgical Unit

## 2023-10-07 LAB
ALBUMIN SERPL-MCNC: 2.7 G/DL (ref 3.5–5)
ALBUMIN/GLOB SERPL: 0.6 RATIO (ref 1.1–2)
ALP SERPL-CCNC: 121 UNIT/L (ref 40–150)
ALT SERPL-CCNC: 27 UNIT/L (ref 0–55)
AST SERPL-CCNC: 41 UNIT/L (ref 5–34)
BASOPHILS # BLD AUTO: 0.02 X10(3)/MCL
BASOPHILS NFR BLD AUTO: 0.1 %
BILIRUB SERPL-MCNC: 0.6 MG/DL
BUN SERPL-MCNC: 10.3 MG/DL (ref 8.9–20.6)
CALCIUM SERPL-MCNC: 8.9 MG/DL (ref 8.4–10.2)
CHLORIDE SERPL-SCNC: 103 MMOL/L (ref 98–107)
CO2 SERPL-SCNC: 26 MMOL/L (ref 22–29)
CREAT SERPL-MCNC: 0.77 MG/DL (ref 0.73–1.18)
EOSINOPHIL # BLD AUTO: 0.14 X10(3)/MCL (ref 0–0.9)
EOSINOPHIL NFR BLD AUTO: 1 %
ERYTHROCYTE [DISTWIDTH] IN BLOOD BY AUTOMATED COUNT: 11.1 % (ref 11.5–17)
GFR SERPLBLD CREATININE-BSD FMLA CKD-EPI: >60 MLS/MIN/1.73/M2
GLOBULIN SER-MCNC: 4.2 GM/DL (ref 2.4–3.5)
GLUCOSE SERPL-MCNC: 104 MG/DL (ref 74–100)
HCT VFR BLD AUTO: 32.9 % (ref 42–52)
HGB BLD-MCNC: 11.2 G/DL (ref 14–18)
IMM GRANULOCYTES # BLD AUTO: 0.11 X10(3)/MCL (ref 0–0.04)
IMM GRANULOCYTES NFR BLD AUTO: 0.8 %
LYMPHOCYTES # BLD AUTO: 2.4 X10(3)/MCL (ref 0.6–4.6)
LYMPHOCYTES NFR BLD AUTO: 16.8 %
MCH RBC QN AUTO: 29.6 PG (ref 27–31)
MCHC RBC AUTO-ENTMCNC: 34 G/DL (ref 33–36)
MCV RBC AUTO: 87 FL (ref 80–94)
MONOCYTES # BLD AUTO: 1.05 X10(3)/MCL (ref 0.1–1.3)
MONOCYTES NFR BLD AUTO: 7.3 %
MTB AND RIF RESISTANCE PNL ISLT/SPM: NOT DETECTED
NEUTROPHILS # BLD AUTO: 10.57 X10(3)/MCL (ref 2.1–9.2)
NEUTROPHILS NFR BLD AUTO: 74 %
NRBC BLD AUTO-RTO: 0 %
PLATELET # BLD AUTO: 476 X10(3)/MCL (ref 130–400)
PMV BLD AUTO: 9 FL (ref 7.4–10.4)
POTASSIUM SERPL-SCNC: 3.7 MMOL/L (ref 3.5–5.1)
PROT SERPL-MCNC: 6.9 GM/DL (ref 6.4–8.3)
RBC # BLD AUTO: 3.78 X10(6)/MCL (ref 4.7–6.1)
SODIUM SERPL-SCNC: 137 MMOL/L (ref 136–145)
WBC # SPEC AUTO: 14.29 X10(3)/MCL (ref 4.5–11.5)

## 2023-10-07 PROCEDURE — 11000001 HC ACUTE MED/SURG PRIVATE ROOM

## 2023-10-07 PROCEDURE — 63600175 PHARM REV CODE 636 W HCPCS

## 2023-10-07 PROCEDURE — 87305 ASPERGILLUS AG IA: CPT

## 2023-10-07 PROCEDURE — 80053 COMPREHEN METABOLIC PANEL: CPT

## 2023-10-07 PROCEDURE — 25000003 PHARM REV CODE 250

## 2023-10-07 PROCEDURE — 86036 ANCA SCREEN EACH ANTIBODY: CPT

## 2023-10-07 PROCEDURE — 87801 DETECT AGNT MULT DNA AMPLI: CPT

## 2023-10-07 PROCEDURE — 87102 FUNGUS ISOLATION CULTURE: CPT

## 2023-10-07 PROCEDURE — 87449 NOS EACH ORGANISM AG IA: CPT

## 2023-10-07 PROCEDURE — 87206 SMEAR FLUORESCENT/ACID STAI: CPT

## 2023-10-07 PROCEDURE — 85025 COMPLETE CBC W/AUTO DIFF WBC: CPT

## 2023-10-07 PROCEDURE — 63600175 PHARM REV CODE 636 W HCPCS: Performed by: EMERGENCY MEDICINE

## 2023-10-07 PROCEDURE — 94761 N-INVAS EAR/PLS OXIMETRY MLT: CPT

## 2023-10-07 PROCEDURE — 87385 HISTOPLASMA CAPSUL AG IA: CPT

## 2023-10-07 PROCEDURE — 99900035 HC TECH TIME PER 15 MIN (STAT)

## 2023-10-07 PROCEDURE — 94799 UNLISTED PULMONARY SVC/PX: CPT

## 2023-10-07 PROCEDURE — 87556 M.TUBERCULO DNA AMP PROBE: CPT | Performed by: STUDENT IN AN ORGANIZED HEALTH CARE EDUCATION/TRAINING PROGRAM

## 2023-10-07 RX ADMIN — ENOXAPARIN SODIUM 40 MG: 40 INJECTION SUBCUTANEOUS at 06:10

## 2023-10-07 RX ADMIN — PIPERACILLIN SODIUM AND TAZOBACTAM SODIUM 4.5 G: 4; .5 INJECTION, POWDER, LYOPHILIZED, FOR SOLUTION INTRAVENOUS at 08:10

## 2023-10-07 RX ADMIN — AZITHROMYCIN MONOHYDRATE 500 MG: 500 INJECTION, POWDER, LYOPHILIZED, FOR SOLUTION INTRAVENOUS at 12:10

## 2023-10-07 RX ADMIN — PIPERACILLIN SODIUM AND TAZOBACTAM SODIUM 4.5 G: 4; .5 INJECTION, POWDER, LYOPHILIZED, FOR SOLUTION INTRAVENOUS at 02:10

## 2023-10-07 RX ADMIN — METRONIDAZOLE 500 MG: 500 INJECTION, SOLUTION INTRAVENOUS at 02:10

## 2023-10-07 RX ADMIN — METRONIDAZOLE 500 MG: 500 INJECTION, SOLUTION INTRAVENOUS at 11:10

## 2023-10-07 NOTE — MEDICAL/APP STUDENT
[facility]  Progress Note    Patient Name: Priyank Fisher Jr.  MRN: [id number]  Patient Class: IP- Inpatient   Admission Date: 10/6/2023  Length of Stay: 1 days  Attending Physician: Zaira Burnett*  Primary Care Provider: Eloisa, Primary Doctor        Subjective:     Principal Problem: Pneumonia with RLL consolidation     Interval History: History of Present Illness:  Patient is a 22 y.o. male presents  to [facility] ED with persistent cough and frequent episodes of hemoptysis that began today. He was evaluated and treated for right sided pneumonia 10/4/23 at [facility] ED and taking all prescribed medicines. Patient reporrts that hemoptysis was initially bright red and is now brown in color. Patient states that he is also experiencing right rib pain at this time and pain with deep inspiration. He reports experiencing fever, chills and night sweats prior to presenting to [facility] ED. Denies SOB or chest pain at this time. Imaging was obtained and CRX revealed right basilar opacities and associated pleural effusion, CTA chest w/o contrast revealed right lower lung lobe is remarkable for large consolidation with associated necrosis and mild cavitations, right hilar enlargement. Afebrile, WBC elevated, 21.95, D-dimer elevated 1.18.  Patient has been admitted to hospitalist medicine for further workup of hemoptysis and management of RLL pneumonia and pleural effusion.    Patient seen at bedside today with attending physician. He is stable, no complaints at this time. States he feels about the same as yesterday. Still experiencing productive cough and pain with coughing; denies blood tinged sputum at this time. Patient denies SOB. Additional work history and exposure history obtained. 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 His last work sight was located in [place]. No recent history of family members  traveling. He denies any sick contacts. . No pets. He uses a vape pen daily, current cigarette smoker, denies routine mariajuana use. Denies any weight loss. Due to patient's imaging and work history further workup for pneumonia required. Will continue to monitor.     Review of Systems   Constitutional:  Negative for chills, diaphoresis, fatigue, fever and unexpected weight change.   HENT:  Negative for nosebleeds.    Respiratory:  Positive for cough. Negative for shortness of breath.         Cough is productive    Cardiovascular:  Negative for chest pain and palpitations.     Objective:     Vital Signs (Most Recent):  Temp: 97.4 °F (36.3 °C) (10/07/23 1121)  Pulse: 77 (10/07/23 1121)  Resp: 16 (10/07/23 0906)  BP: (!) 108/59 (10/07/23 1121)  SpO2: 99 % (10/07/23 1121) Vital Signs (24h Range):  Temp:  [97.4 °F (36.3 °C)-98.6 °F (37 °C)] 97.4 °F (36.3 °C)  Pulse:  [73-91] 77  Resp:  [15-20] 16  SpO2:  [96 %-100 %] 99 %  BP: (100-123)/(59-71) 108/59     Weight: 70 kg (154 lb 5.2 oz)  Body mass index is 20.93 kg/m².    Intake/Output Summary (Last 24 hours) at 10/7/2023 1204  Last data filed at 10/7/2023 0556  Gross per 24 hour   Intake 2344.56 ml   Output 600 ml   Net 1744.56 ml      Physical Exam  Constitutional:       Appearance: Normal appearance. He is normal weight.   HENT:      Head: Normocephalic and atraumatic.      Mouth/Throat:      Mouth: Mucous membranes are moist.      Pharynx: Oropharynx is clear.   Eyes:      Extraocular Movements: Extraocular movements intact.      Conjunctiva/sclera: Conjunctivae normal.      Pupils: Pupils are equal, round, and reactive to light.   Cardiovascular:      Rate and Rhythm: Normal rate and regular rhythm.      Pulses: Normal pulses.      Heart sounds: Normal heart sounds. No murmur heard.     No friction rub. No gallop.   Pulmonary:      Effort: Pulmonary effort is normal.      Comments: Moving air well. Decreased breath sounds RLL   Musculoskeletal:      Cervical back:  "Normal range of motion and neck supple.   Skin:     General: Skin is warm.   Neurological:      General: No focal deficit present.      Mental Status: He is alert and oriented to person, place, and time.   Psychiatric:         Mood and Affect: Mood normal.         Behavior: Behavior normal.         Thought Content: Thought content normal.         Judgment: Judgment normal.         Significant Labs: All pertinent labs within the past 24 hours have been reviewed.  Blood Culture: No results for input(s): "LABBLOO" in the last 48 hours.  BMP:   Recent Labs   Lab 10/07/23  0523      K 3.7   CO2 26   BUN 10.3   CREATININE 0.77   CALCIUM 8.9     CBC:   Recent Labs   Lab 10/06/23  1444 10/07/23  0523   WBC 21.95* 14.29*   HGB 12.5* 11.2*   HCT 37.1* 32.9*   * 476*     CMP:   Recent Labs   Lab 10/06/23  1444 10/07/23  0523   * 137   K 4.8 3.7   CO2 29 26   BUN 10.0 10.3   CREATININE 0.73 0.77   CALCIUM 10.0 8.9   ALBUMIN 3.2* 2.7*   BILITOT 1.0 0.6   ALKPHOS 138 121   AST 32 41*   ALT 28 27       Significant Imaging: I have reviewed all pertinent imaging results/findings within the past 24 hours.    Assessment/Plan:   Plan:   Pneumonia with RLL consolidation:  WBC: 21.95 on Admit, Improved today 14.9  Lactic Acid: WNL  D-Dimer 1.18 on admit  Respiratory culture pending  Blood culture ordered x2 pending  D/c Rocephin 1g IM QDay  D/c Levofloxacin 750mg/150mL Qday  D/c Metronidazole 500mg IV TID   Start Zosyn 4.5g TID  Start azithromycin 500mg Qday  Albuterol Inhaler 2 puffs QID PRN   Additional labs ordered:   Autoimmune: ANCA, LUPIS   Fungal: Aspergillus antigen, Blastomyces urine antigen, Fungitell assay, AFB x 3, Blood cultures AFB and fungus, histoplasma  Bacterial: Legionella PCR, m. Tuberculosis DNA PCR, Quantiferon Gold TB, s. Pneumoniae urine antigen    Pleural Effusion:   Possibly secondary to pneumonia or liver disease. Patient with enlarged liver on imaging and admits to persistent alcohol use, " obtaining additional labs for further workup.  Albumin low, 3.2  4+ Urobilinogen and Trace protein on UA     Hemoptysis:  Patient denies any recurrence  Continue Tessalon Pearls 200 PRN cough     Hep Panel, RPR, HIV - all negative    CODE STATUS: Full  Access: PIV  Antibiotics: Ceftriaxone and Metronidazole   Diet: Regular  DVT Prophylaxis: Lovenox  GI Prophylaxis: none  Fluids: none    Disposition: Admitted for IV abx. Blood and respiratory cultures pending.            VTE Risk Mitigation (From admission, onward)           Ordered     enoxaparin injection 40 mg  Daily         10/06/23 1808     IP VTE LOW RISK PATIENT  Once         10/06/23 1808                       Tuyet Elliott  Department of Hospital Medicine   [facility]  [facility] Surg Telemetry
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
## 2023-10-08 LAB
ALBUMIN SERPL-MCNC: 2.8 G/DL (ref 3.5–5)
ALBUMIN/GLOB SERPL: 0.6 RATIO (ref 1.1–2)
ALP SERPL-CCNC: 107 UNIT/L (ref 40–150)
ALT SERPL-CCNC: 25 UNIT/L (ref 0–55)
AST SERPL-CCNC: 23 UNIT/L (ref 5–34)
BACTERIA SPEC CULT: NORMAL
BASOPHILS # BLD AUTO: 0.04 X10(3)/MCL
BASOPHILS NFR BLD AUTO: 0.4 %
BILIRUB SERPL-MCNC: 0.5 MG/DL
BUN SERPL-MCNC: 7.7 MG/DL (ref 8.9–20.6)
CALCIUM SERPL-MCNC: 9.3 MG/DL (ref 8.4–10.2)
CHLORIDE SERPL-SCNC: 104 MMOL/L (ref 98–107)
CO2 SERPL-SCNC: 24 MMOL/L (ref 22–29)
CREAT SERPL-MCNC: 0.7 MG/DL (ref 0.73–1.18)
EOSINOPHIL # BLD AUTO: 0.24 X10(3)/MCL (ref 0–0.9)
EOSINOPHIL NFR BLD AUTO: 2.7 %
ERYTHROCYTE [DISTWIDTH] IN BLOOD BY AUTOMATED COUNT: 11.2 % (ref 11.5–17)
GFR SERPLBLD CREATININE-BSD FMLA CKD-EPI: >60 MLS/MIN/1.73/M2
GLOBULIN SER-MCNC: 4.4 GM/DL (ref 2.4–3.5)
GLUCOSE SERPL-MCNC: 113 MG/DL (ref 74–100)
GRAM STN SPEC: NORMAL
HCT VFR BLD AUTO: 36.8 % (ref 42–52)
HGB BLD-MCNC: 12.1 G/DL (ref 14–18)
IMM GRANULOCYTES # BLD AUTO: 0.14 X10(3)/MCL (ref 0–0.04)
IMM GRANULOCYTES NFR BLD AUTO: 1.6 %
LYMPHOCYTES # BLD AUTO: 2.44 X10(3)/MCL (ref 0.6–4.6)
LYMPHOCYTES NFR BLD AUTO: 27.2 %
M AVIUM PARATB TISS QL ZN STN: NORMAL
MCH RBC QN AUTO: 28.8 PG (ref 27–31)
MCHC RBC AUTO-ENTMCNC: 32.9 G/DL (ref 33–36)
MCV RBC AUTO: 87.6 FL (ref 80–94)
MONOCYTES # BLD AUTO: 0.83 X10(3)/MCL (ref 0.1–1.3)
MONOCYTES NFR BLD AUTO: 9.3 %
NEUTROPHILS # BLD AUTO: 5.27 X10(3)/MCL (ref 2.1–9.2)
NEUTROPHILS NFR BLD AUTO: 58.8 %
NRBC BLD AUTO-RTO: 0 %
PLATELET # BLD AUTO: 456 X10(3)/MCL (ref 130–400)
PMV BLD AUTO: 9.2 FL (ref 7.4–10.4)
POTASSIUM SERPL-SCNC: 3.7 MMOL/L (ref 3.5–5.1)
PROT SERPL-MCNC: 7.2 GM/DL (ref 6.4–8.3)
RBC # BLD AUTO: 4.2 X10(6)/MCL (ref 4.7–6.1)
SODIUM SERPL-SCNC: 137 MMOL/L (ref 136–145)
WBC # SPEC AUTO: 8.96 X10(3)/MCL (ref 4.5–11.5)

## 2023-10-08 PROCEDURE — 25000003 PHARM REV CODE 250

## 2023-10-08 PROCEDURE — 11000001 HC ACUTE MED/SURG PRIVATE ROOM

## 2023-10-08 PROCEDURE — 27000207 HC ISOLATION

## 2023-10-08 PROCEDURE — 63600175 PHARM REV CODE 636 W HCPCS

## 2023-10-08 PROCEDURE — 80053 COMPREHEN METABOLIC PANEL: CPT

## 2023-10-08 PROCEDURE — 85025 COMPLETE CBC W/AUTO DIFF WBC: CPT

## 2023-10-08 PROCEDURE — 87116 MYCOBACTERIA CULTURE: CPT | Performed by: STUDENT IN AN ORGANIZED HEALTH CARE EDUCATION/TRAINING PROGRAM

## 2023-10-08 PROCEDURE — 99900035 HC TECH TIME PER 15 MIN (STAT)

## 2023-10-08 PROCEDURE — 94761 N-INVAS EAR/PLS OXIMETRY MLT: CPT

## 2023-10-08 PROCEDURE — 87206 SMEAR FLUORESCENT/ACID STAI: CPT

## 2023-10-08 RX ADMIN — AZITHROMYCIN MONOHYDRATE 500 MG: 500 INJECTION, POWDER, LYOPHILIZED, FOR SOLUTION INTRAVENOUS at 12:10

## 2023-10-08 RX ADMIN — ENOXAPARIN SODIUM 40 MG: 40 INJECTION SUBCUTANEOUS at 05:10

## 2023-10-08 RX ADMIN — PIPERACILLIN SODIUM AND TAZOBACTAM SODIUM 4.5 G: 4; .5 INJECTION, POWDER, LYOPHILIZED, FOR SOLUTION INTRAVENOUS at 05:10

## 2023-10-08 RX ADMIN — PIPERACILLIN SODIUM AND TAZOBACTAM SODIUM 4.5 G: 4; .5 INJECTION, POWDER, LYOPHILIZED, FOR SOLUTION INTRAVENOUS at 02:10

## 2023-10-08 RX ADMIN — PIPERACILLIN SODIUM AND TAZOBACTAM SODIUM 4.5 G: 4; .5 INJECTION, POWDER, LYOPHILIZED, FOR SOLUTION INTRAVENOUS at 09:10
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
## 2023-10-09 LAB
ALBUMIN SERPL-MCNC: 3.2 G/DL (ref 3.5–5)
ALBUMIN/GLOB SERPL: 0.7 RATIO (ref 1.1–2)
ALP SERPL-CCNC: 123 UNIT/L (ref 40–150)
ALT SERPL-CCNC: 32 UNIT/L (ref 0–55)
AST SERPL-CCNC: 34 UNIT/L (ref 5–34)
B PERT.PT PRMT NPH QL NAA+NON-PROBE: NOT DETECTED
BASOPHILS # BLD AUTO: 0.04 X10(3)/MCL
BASOPHILS NFR BLD AUTO: 0.4 %
BILIRUB SERPL-MCNC: 0.4 MG/DL
BUN SERPL-MCNC: 9.3 MG/DL (ref 8.9–20.6)
C PNEUM DNA NPH QL NAA+NON-PROBE: NOT DETECTED
C TRACH DNA SPEC QL NAA+PROBE: NOT DETECTED
CALCIUM SERPL-MCNC: 9.7 MG/DL (ref 8.4–10.2)
CHLORIDE SERPL-SCNC: 101 MMOL/L (ref 98–107)
CO2 SERPL-SCNC: 29 MMOL/L (ref 22–29)
CREAT SERPL-MCNC: 0.79 MG/DL (ref 0.73–1.18)
CRYPTOC AG SER QL IA.RAPID: NEGATIVE
CRYPTOC AG TITR CSF IA: NORMAL {TITER}
EOSINOPHIL # BLD AUTO: 0.26 X10(3)/MCL (ref 0–0.9)
EOSINOPHIL NFR BLD AUTO: 2.4 %
ERYTHROCYTE [DISTWIDTH] IN BLOOD BY AUTOMATED COUNT: 11.5 % (ref 11.5–17)
FLUAV AG UPPER RESP QL IA.RAPID: NOT DETECTED
FLUBV AG UPPER RESP QL IA.RAPID: NOT DETECTED
GFR SERPLBLD CREATININE-BSD FMLA CKD-EPI: >60 MLS/MIN/1.73/M2
GLOBULIN SER-MCNC: 4.8 GM/DL (ref 2.4–3.5)
GLUCOSE SERPL-MCNC: 80 MG/DL (ref 74–100)
HADV DNA NPH QL NAA+NON-PROBE: NOT DETECTED
HCOV 229E RNA NPH QL NAA+NON-PROBE: NOT DETECTED
HCOV HKU1 RNA NPH QL NAA+NON-PROBE: NOT DETECTED
HCOV NL63 RNA NPH QL NAA+NON-PROBE: NOT DETECTED
HCOV OC43 RNA NPH QL NAA+NON-PROBE: NOT DETECTED
HCT VFR BLD AUTO: 37.4 % (ref 42–52)
HGB BLD-MCNC: 12.5 G/DL (ref 14–18)
HMPV RNA NPH QL NAA+NON-PROBE: NOT DETECTED
HPIV1 RNA NPH QL NAA+NON-PROBE: NOT DETECTED
HPIV2 RNA NPH QL NAA+NON-PROBE: NOT DETECTED
HPIV3 RNA NPH QL NAA+NON-PROBE: NOT DETECTED
HPIV4 RNA NPH QL NAA+NON-PROBE: NOT DETECTED
IMM GRANULOCYTES # BLD AUTO: 0.17 X10(3)/MCL (ref 0–0.04)
IMM GRANULOCYTES NFR BLD AUTO: 1.6 %
LYMPHOCYTES # BLD AUTO: 2.41 X10(3)/MCL (ref 0.6–4.6)
LYMPHOCYTES NFR BLD AUTO: 22.4 %
M AVIUM PARATB TISS QL ZN STN: NORMAL
M PNEUMO DNA NPH QL NAA+NON-PROBE: NOT DETECTED
MCH RBC QN AUTO: 29.8 PG (ref 27–31)
MCHC RBC AUTO-ENTMCNC: 33.4 G/DL (ref 33–36)
MCV RBC AUTO: 89.3 FL (ref 80–94)
MONOCYTES # BLD AUTO: 0.65 X10(3)/MCL (ref 0.1–1.3)
MONOCYTES NFR BLD AUTO: 6 %
MRSA PCR SCRN (OHS): NOT DETECTED
MTB AND RIF RESISTANCE PNL ISLT/SPM: NOT DETECTED
N GONORRHOEA DNA SPEC QL NAA+PROBE: NOT DETECTED
NEUTROPHILS # BLD AUTO: 7.25 X10(3)/MCL (ref 2.1–9.2)
NEUTROPHILS NFR BLD AUTO: 67.2 %
NRBC BLD AUTO-RTO: 0 %
PLATELET # BLD AUTO: 585 X10(3)/MCL (ref 130–400)
PMV BLD AUTO: 9.1 FL (ref 7.4–10.4)
POTASSIUM SERPL-SCNC: 3.9 MMOL/L (ref 3.5–5.1)
PROT SERPL-MCNC: 8 GM/DL (ref 6.4–8.3)
RBC # BLD AUTO: 4.19 X10(6)/MCL (ref 4.7–6.1)
RSV RNA NPH QL NAA+NON-PROBE: NOT DETECTED
RV+EV RNA NPH QL NAA+NON-PROBE: NOT DETECTED
SARS-COV-2 RNA RESP QL NAA+PROBE: NOT DETECTED
SODIUM SERPL-SCNC: 139 MMOL/L (ref 136–145)
SOURCE (OHS): NORMAL
WBC # SPEC AUTO: 10.78 X10(3)/MCL (ref 4.5–11.5)

## 2023-10-09 PROCEDURE — 27000207 HC ISOLATION

## 2023-10-09 PROCEDURE — 80053 COMPREHEN METABOLIC PANEL: CPT

## 2023-10-09 PROCEDURE — 87103 BLOOD FUNGUS CULTURE: CPT

## 2023-10-09 PROCEDURE — 87899 AGENT NOS ASSAY W/OPTIC: CPT | Performed by: STUDENT IN AN ORGANIZED HEALTH CARE EDUCATION/TRAINING PROGRAM

## 2023-10-09 PROCEDURE — 87798 DETECT AGENT NOS DNA AMP: CPT | Performed by: NURSE PRACTITIONER

## 2023-10-09 PROCEDURE — 11000001 HC ACUTE MED/SURG PRIVATE ROOM

## 2023-10-09 PROCEDURE — 94761 N-INVAS EAR/PLS OXIMETRY MLT: CPT

## 2023-10-09 PROCEDURE — 86480 TB TEST CELL IMMUN MEASURE: CPT

## 2023-10-09 PROCEDURE — 87556 M.TUBERCULO DNA AMP PROBE: CPT | Performed by: STUDENT IN AN ORGANIZED HEALTH CARE EDUCATION/TRAINING PROGRAM

## 2023-10-09 PROCEDURE — 87491 CHLMYD TRACH DNA AMP PROBE: CPT | Performed by: NURSE PRACTITIONER

## 2023-10-09 PROCEDURE — 0240U COVID/FLU A&B PCR: CPT | Performed by: NURSE PRACTITIONER

## 2023-10-09 PROCEDURE — 87633 RESP VIRUS 12-25 TARGETS: CPT | Performed by: NURSE PRACTITIONER

## 2023-10-09 PROCEDURE — 86039 ANTINUCLEAR ANTIBODIES (ANA): CPT | Performed by: STUDENT IN AN ORGANIZED HEALTH CARE EDUCATION/TRAINING PROGRAM

## 2023-10-09 PROCEDURE — 87591 N.GONORRHOEAE DNA AMP PROB: CPT | Performed by: NURSE PRACTITIONER

## 2023-10-09 PROCEDURE — 86036 ANCA SCREEN EACH ANTIBODY: CPT | Performed by: STUDENT IN AN ORGANIZED HEALTH CARE EDUCATION/TRAINING PROGRAM

## 2023-10-09 PROCEDURE — 87206 SMEAR FLUORESCENT/ACID STAI: CPT

## 2023-10-09 PROCEDURE — 87641 MR-STAPH DNA AMP PROBE: CPT | Performed by: NURSE PRACTITIONER

## 2023-10-09 PROCEDURE — 85025 COMPLETE CBC W/AUTO DIFF WBC: CPT

## 2023-10-09 PROCEDURE — 63600175 PHARM REV CODE 636 W HCPCS

## 2023-10-09 PROCEDURE — 99900035 HC TECH TIME PER 15 MIN (STAT)

## 2023-10-09 PROCEDURE — 87899 AGENT NOS ASSAY W/OPTIC: CPT | Performed by: NURSE PRACTITIONER

## 2023-10-09 PROCEDURE — 94640 AIRWAY INHALATION TREATMENT: CPT

## 2023-10-09 PROCEDURE — 87116 MYCOBACTERIA CULTURE: CPT

## 2023-10-09 PROCEDURE — 25000003 PHARM REV CODE 250

## 2023-10-09 RX ORDER — SODIUM CHLORIDE FOR INHALATION 3 %
4 VIAL, NEBULIZER (ML) INHALATION ONCE
Status: COMPLETED | OUTPATIENT
Start: 2023-10-09 | End: 2023-10-10

## 2023-10-09 RX ADMIN — AZITHROMYCIN MONOHYDRATE 500 MG: 500 INJECTION, POWDER, LYOPHILIZED, FOR SOLUTION INTRAVENOUS at 01:10

## 2023-10-09 RX ADMIN — PIPERACILLIN SODIUM AND TAZOBACTAM SODIUM 4.5 G: 4; .5 INJECTION, POWDER, LYOPHILIZED, FOR SOLUTION INTRAVENOUS at 05:10

## 2023-10-09 RX ADMIN — ENOXAPARIN SODIUM 40 MG: 40 INJECTION SUBCUTANEOUS at 04:10

## 2023-10-09 RX ADMIN — PIPERACILLIN SODIUM AND TAZOBACTAM SODIUM 4.5 G: 4; .5 INJECTION, POWDER, LYOPHILIZED, FOR SOLUTION INTRAVENOUS at 11:10

## 2023-10-09 RX ADMIN — PIPERACILLIN SODIUM AND TAZOBACTAM SODIUM 4.5 G: 4; .5 INJECTION, POWDER, LYOPHILIZED, FOR SOLUTION INTRAVENOUS at 03:10
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
## 2023-10-09 NOTE — CONSULTS
[facility]   Inpatient Infectious Diseases Consultation    Physician requesting consultation: Zaira Burnett*  Service requesting consultation: Internal Medicine  Reason for consultation:  RT lung consolidation    Historian: Patient and Electronic Medical Record    Isolation Status: Airborne     HPI:     Priyank is a 22 yr old BM with past medical history that is significant for vaping x3 years and tobacco use x3 weeks who was admitted on 10/06/2023 with complaints of hemoptysis with a right lower lobe consolidation/cavitation.  He reports he was seen 2 days prior in the emergency room for pain to his right lateral side/long.  He was found to have a right bibasilar pneumonia and was given a prescription for Levaquin and steroids.  He reports he started taking the Levaquin and then he started with bloody cough.  Prior to this he also experienced chills, subjective fevers, and night sweats.  He denies weight loss.  CTA from 10/06/2023 showed concerns for no PE, but did show a right lower lobe large consolidation with associated necrosis and cavitation.  MTB PCR x1 is negative, along with AFB smears x2 that are negative.  Patient has been afebrile throughout the course of his hospitalization.  He was noted to have significant leukocytosis (WBC 21.9) from 10/6/23 which resolved throughout his hospital stay.  Patient is negative for COVID/flu.  Screening for HIV, hepatitis, and syphilis was also negative.  Patient reports all prior symptoms have since resolved and he is feeling better.  Denies current fever, night sweats, chills, weight loss, SOB, cough, or bloody sputum.  Patient is currently on Azithromycin and Zosyn without any reported issues.  He works on a [employer].  Previous traveled to [place] and [place].  Patient lives between father and girlfriend's homes.  He states he has never been incarcerated (but did spend 1 night in group home previously), homeless, are  exposed to anybody with known TB.  No pets.  No sick contacts.  Endorses prior use of marijuana; last use 3 months ago.  No additional illicit drugs or IV drug use. Pt is currently on airborne precautions.       Antibiotic history:  Levaquin 750 mg po daily - 10/4/23 to 10/6/23  Rocephin 1 gram IV x 1 dose on 10/6/23  Levofloxacin 750 mg IV x 1 dose on 10/6/23  Flagyl 500 mg IV q 8 hours - 10/6/23 to 10/7/23  Zosyn 4.5 g IV q 8 hours - 10/7/23 to present  Azithromycin 500 mg IV daily - 10/7/23 to present      Past Medical History/Past Surgical History/Social History     History reviewed. No pertinent past medical history.    History reviewed. No pertinent surgical history.     FAMILY HISTORY:  family history is not on file.     SOCIAL HISTORY:   Social History     Socioeconomic History    Marital status: Single   Tobacco Use    Smoking status: Every Day     Types: Vaping with nicotine    Smokeless tobacco: Never   Substance and Sexual Activity    Drug use: Yes     Types: Marijuana        ALLERGIES: Review of patient's allergies indicates:  No Known Allergies      Review of Systems     Review of Systems   Constitutional:  Negative for chills (resolved), diaphoresis (resolved), fever (resolved), malaise/fatigue and weight loss.   HENT:  Negative for congestion, ear pain, hearing loss and sore throat.    Eyes:  Negative for blurred vision, photophobia and pain.   Respiratory:  Positive for cough. Negative for hemoptysis (resolved), sputum production (resolved) and shortness of breath.         + RT sided chest wall pain   Cardiovascular:  Negative for chest pain, palpitations, orthopnea and leg swelling. PND: dry cough at this time.  Gastrointestinal:  Negative for abdominal pain, constipation, diarrhea, nausea and vomiting.   Genitourinary:  Negative for dysuria, flank pain, frequency, hematuria and urgency.   Musculoskeletal:  Negative for back pain, joint pain, myalgias and neck pain.   Skin:  Negative for itching  and rash.   Neurological:  Negative for dizziness, sensory change, seizures, weakness and headaches.   Endo/Heme/Allergies:  Does not bruise/bleed easily.   Psychiatric/Behavioral:  Negative for depression, hallucinations and substance abuse. The patient is not nervous/anxious.          MEDICATIONS:   Scheduled Meds:   azithromycin  500 mg Intravenous Q24H    enoxparin  40 mg Subcutaneous Daily    piperacillin-tazobactam (Zosyn) IV (PEDS and ADULTS) (extended infusion is not appropriate)  4.5 g Intravenous Q8H    sodium chloride 3%  4 mL Nebulization Once     Continuous Infusions:  PRN Meds:.acetaminophen, albuterol, benzonatate, melatonin, sodium chloride 0.9%    Physical exam:     Temp:  [97.7 °F (36.5 °C)-98.7 °F (37.1 °C)] 97.8 °F (36.6 °C)  Pulse:  [71-93] 76  Resp:  [14-16] 14  SpO2:  [96 %-100 %] 99 %  BP: (101-115)/(55-75) 112/73     GENERAL: A&Ox3, NAD, does not appear ill  HEENT: atraumatic, normocephalic, anicteric, moist oral mucosa without lesions, exudate, or erythema; no thrush  LUNGS: breathing unlabored, lungs CTA bilateral  HEART: RRR; no murmur, rub, or gallop  ABDOMEN: abdomen soft, nondistended, BS noted x 4 quadrants, no tympany, no rebound, guarding, or organomegaly  : no CVA tenderness  EXTREMITIES: no edema, clubbing, or cyanosis   SKIN: no rashes or lesions  NEURO: speech fluent and intact, facial symmetry preserved, no tremor  PSYCH: cooperative, normal mood and affect  LINES: peripheral IV site       LABS:     I have personally reviewed patient's labs.  Pertinent results noted below.    CBC  Recent Labs     10/07/23  0523 10/08/23  0508 10/09/23  0332   WBC 14.29* 8.96 10.78   HGB 11.2* 12.1* 12.5*   HCT 32.9* 36.8* 37.4*   * 456* 585*       Differential  Recent Labs   Lab 10/09/23  0332   WBC 10.78   HGB 12.5*   HCT 37.4*   *        Basic Metabolic Panel  Recent Labs     10/07/23  0523 10/08/23  0515 10/09/23  0332    137 139   K 3.7 3.7 3.9   CO2 26 24 29   BUN  10.3 7.7* 9.3   CREATININE 0.77 0.70* 0.79        Hepatic Panel  Lab Results   Component Value Date    ALT 32 10/09/2023    AST 34 10/09/2023    ALKPHOS 123 10/09/2023    BILITOT 0.4 10/09/2023        Urinalysis:  Results for orders placed or performed during the hospital encounter of 10/04/23   Urinalysis, Reflex to Urine Culture    Specimen: Urine   Result Value Ref Range    Color, UA Yellow Yellow, Light-Yellow, Dark Yellow, Ese, Straw    Appearance, UA Clear Clear    Specific Gravity, UA 1.022 1.005 - 1.030    pH, UA 6.5 5.0 - 8.5    Protein, UA Trace (A) Negative    Glucose, UA Normal Negative, Normal    Ketones, UA Negative Negative    Blood, UA Negative Negative    Bilirubin, UA Negative Negative    Urobilinogen, UA +4 (A) 0.2, 1.0, Normal    Nitrites, UA Negative Negative    Leukocyte Esterase, UA Negative Negative    WBC, UA 0-5 None Seen, 0-2, 3-5, 0-5 /HPF    Bacteria, UA None Seen None Seen /HPF    Squamous Epithelial Cells, UA Trace (A) None Seen /HPF    Mucous, UA Trace (A) None Seen /LPF    Hyaline Casts, UA None Seen None Seen /lpf    RBC, UA 6-10 (A) None Seen, 0-2, 3-5, 0-5 /HPF       Estimated Creatinine Clearance: 146.4 mL/min (based on SCr of 0.79 mg/dL).       MICRO AND PATHOLOGY:   Colonization:  10/6/23 Blood cultures NGTD  10/6/23 Respiratory culture with normal michael  10/7/23 AFB sputum smear negative  10/8/23 AFB sputum smear negative  10/9/23 Fungal blood culture pending      IMAGING:     I have personally reviewed patient's imaging. Pertinent results noted below.  CTA Chest Non-Coronary (PE Studies)   Final Result      1.  No pulmonary embolism identified.      2.  Right lower lung lobe large consolidation with associated necrosis and cavitations.         Electronically signed by: Joe Mendez   Date:    10/06/2023   Time:    16:49      X-Ray Chest PA And Lateral   Final Result      Partially improved right basilar lower lung lobe infiltrates and associated pleural effusion.          Electronically signed by: Joe Irwinahim   Date:    10/06/2023   Time:    14:53            IMPRESSION     RT lower lobe consolidation / cavitation  Resolved hemoptysis  Resolved leukocytosis  Tobacco use  Vapes    RECOMMENDATIONS:    RT lower lobe consolidation / cavitation with prior hemoptysis, chills, subjective fevers, and night sweats which have resolved.  Significant leukocytosis has resolved.  COVID/flu are negative.  Screening for HIV, syphilis, hepatitis are all negative.  MTB PCR is negative x1.      Organisms that can be associated with cavitary lung lesions can be bacterial (Streptococcus milleri/pneumonia, Staph aureus, Haemophilus influenzae, Kleb pneumo, and Pseudomonas aeruginosa, TB and NMTB, Actinomyces, Bacteroides, Nocardia, Burkholderia) fungal, viral, or parasitic.  At this time patient needs a 2nd MTB PCR collected.  This needs to be induced if needed.  If patient gets a 2nd MTP PCR that is negative, airborne precautions can be removed.  Recommend additional sputum collection for AFB smear x1 more and mycobacteria/Nocardia culture x3.    Additional pending tests include:  Urine Legionella antigen, urine strep pneumo antigen, QuantiFERON gold, urine histo antigen, urine blasto antigen, Fungitell, and Aspergillus antigen.    Cryptococcal antigen and respiratory PCR panel have been added to workup.  MRSA nares has also been added. Patient does not endorse any concerns for any parasitic exposures that need screening.    At this time can continue Azithromycin 500 mg IV daily, along with Zosyn 4.5 g IV Q 8 hours.    Smoking/vaping cessation encouraged    Thank you for the consult. We will follow along with you. Please do not hesitate to call with any questions or concerns.       ROB Rojas  [facility] Infectious Diseases

## 2023-10-10 VITALS
OXYGEN SATURATION: 100 % | HEIGHT: 72 IN | SYSTOLIC BLOOD PRESSURE: 103 MMHG | DIASTOLIC BLOOD PRESSURE: 63 MMHG | BODY MASS INDEX: 20.9 KG/M2 | HEART RATE: 99 BPM | WEIGHT: 154.31 LBS | RESPIRATION RATE: 16 BRPM | TEMPERATURE: 98 F

## 2023-10-10 PROBLEM — R04.2 HEMOPTYSIS: Status: ACTIVE | Noted: 2023-10-10

## 2023-10-10 PROBLEM — J18.9 PNEUMONIA OF RIGHT LOWER LOBE DUE TO INFECTIOUS ORGANISM: Status: ACTIVE | Noted: 2023-10-10
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
## 2023-10-10 LAB
1,3 BETA GLUCAN SER QL: NEGATIVE
1,3 BETA GLUCAN SER-MCNC: <31 PG/ML
ALBUMIN SERPL-MCNC: 3 G/DL (ref 3.5–5)
ALBUMIN/GLOB SERPL: 0.7 RATIO (ref 1.1–2)
ALP SERPL-CCNC: 99 UNIT/L (ref 40–150)
ALT SERPL-CCNC: 42 UNIT/L (ref 0–55)
ANA SER QL HEP2 SUBST: NORMAL
AST SERPL-CCNC: 47 UNIT/L (ref 5–34)
BASOPHILS # BLD AUTO: 0.05 X10(3)/MCL
BASOPHILS NFR BLD AUTO: 0.5 %
BILIRUB SERPL-MCNC: 0.4 MG/DL
BUN SERPL-MCNC: 9.3 MG/DL (ref 8.9–20.6)
CALCIUM SERPL-MCNC: 9.2 MG/DL (ref 8.4–10.2)
CHLORIDE SERPL-SCNC: 105 MMOL/L (ref 98–107)
CO2 SERPL-SCNC: 29 MMOL/L (ref 22–29)
CREAT SERPL-MCNC: 1.04 MG/DL (ref 0.73–1.18)
EOSINOPHIL # BLD AUTO: 0.3 X10(3)/MCL (ref 0–0.9)
EOSINOPHIL NFR BLD AUTO: 3.2 %
ERYTHROCYTE [DISTWIDTH] IN BLOOD BY AUTOMATED COUNT: 11.4 % (ref 11.5–17)
GALACTOMANNAN AG SERPL IA-ACNC: <0.5 INDEX
GBM QUANT (OHS): 1.6 U/ML
GFR SERPLBLD CREATININE-BSD FMLA CKD-EPI: >60 MLS/MIN/1.73/M2
GLOBULIN SER-MCNC: 4.2 GM/DL (ref 2.4–3.5)
GLUCOSE SERPL-MCNC: 104 MG/DL (ref 74–100)
HCT VFR BLD AUTO: 37.4 % (ref 42–52)
HGB BLD-MCNC: 12.2 G/DL (ref 14–18)
IMM GRANULOCYTES # BLD AUTO: 0.31 X10(3)/MCL (ref 0–0.04)
IMM GRANULOCYTES NFR BLD AUTO: 3.3 %
LYMPHOCYTES # BLD AUTO: 2.53 X10(3)/MCL (ref 0.6–4.6)
LYMPHOCYTES NFR BLD AUTO: 26.7 %
M AVIUM PARATB TISS QL ZN STN: NORMAL
MAYO GENERIC ORDERABLE RESULT: NORMAL
MCH RBC QN AUTO: 29 PG (ref 27–31)
MCHC RBC AUTO-ENTMCNC: 32.6 G/DL (ref 33–36)
MCV RBC AUTO: 88.8 FL (ref 80–94)
MONOCYTES # BLD AUTO: 0.66 X10(3)/MCL (ref 0.1–1.3)
MONOCYTES NFR BLD AUTO: 7 %
MPO QUANT (OLG): 0.2 U/ML
NEUTROPHILS # BLD AUTO: 5.63 X10(3)/MCL (ref 2.1–9.2)
NEUTROPHILS NFR BLD AUTO: 59.3 %
NRBC BLD AUTO-RTO: 0 %
PLATELET # BLD AUTO: 550 X10(3)/MCL (ref 130–400)
PMV BLD AUTO: 8.8 FL (ref 7.4–10.4)
POTASSIUM SERPL-SCNC: 4.8 MMOL/L (ref 3.5–5.1)
PR3 QUANT (OHS): <0.6 U/ML
PROT SERPL-MCNC: 7.2 GM/DL (ref 6.4–8.3)
RBC # BLD AUTO: 4.21 X10(6)/MCL (ref 4.7–6.1)
SODIUM SERPL-SCNC: 141 MMOL/L (ref 136–145)
WBC # SPEC AUTO: 9.48 X10(3)/MCL (ref 4.5–11.5)

## 2023-10-10 PROCEDURE — 25000242 PHARM REV CODE 250 ALT 637 W/ HCPCS: Performed by: NURSE PRACTITIONER

## 2023-10-10 PROCEDURE — 94640 AIRWAY INHALATION TREATMENT: CPT

## 2023-10-10 PROCEDURE — 94761 N-INVAS EAR/PLS OXIMETRY MLT: CPT

## 2023-10-10 PROCEDURE — 25000003 PHARM REV CODE 250

## 2023-10-10 PROCEDURE — 85025 COMPLETE CBC W/AUTO DIFF WBC: CPT

## 2023-10-10 PROCEDURE — 99900035 HC TECH TIME PER 15 MIN (STAT)

## 2023-10-10 PROCEDURE — 63600175 PHARM REV CODE 636 W HCPCS

## 2023-10-10 PROCEDURE — 80053 COMPREHEN METABOLIC PANEL: CPT

## 2023-10-10 RX ORDER — AMOXICILLIN AND CLAVULANATE POTASSIUM 875; 125 MG/1; MG/1
1 TABLET, FILM COATED ORAL EVERY 12 HOURS
Qty: 10 TABLET | Refills: 0 | Status: SHIPPED | OUTPATIENT
Start: 2023-10-10

## 2023-10-10 RX ADMIN — AZITHROMYCIN MONOHYDRATE 500 MG: 500 INJECTION, POWDER, LYOPHILIZED, FOR SOLUTION INTRAVENOUS at 01:10

## 2023-10-10 RX ADMIN — SODIUM CHLORIDE 30 MG/ML INHALATION SOLUTION 4 ML: 30 SOLUTION INHALANT at 02:10

## 2023-10-10 RX ADMIN — PIPERACILLIN SODIUM AND TAZOBACTAM SODIUM 4.5 G: 4; .5 INJECTION, POWDER, LYOPHILIZED, FOR SOLUTION INTRAVENOUS at 08:10
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
## 2023-10-10 NOTE — PLAN OF CARE
Problem: Adult Inpatient Plan of Care  Goal: Plan of Care Review  10/10/2023 1511 by Yojana Fisher RN  Outcome: Met  10/10/2023 1131 by Yojana Fisher RN  Outcome: Ongoing, Progressing  Goal: Patient-Specific Goal (Individualized)  10/10/2023 1511 by Yojana Fisher RN  Outcome: Met  10/10/2023 1131 by Yojana Fisher RN  Outcome: Ongoing, Progressing  Goal: Absence of Hospital-Acquired Illness or Injury  10/10/2023 1511 by Yojana Fisher RN  Outcome: Met  10/10/2023 1131 by Yojana Fisher RN  Outcome: Ongoing, Progressing  Goal: Optimal Comfort and Wellbeing  10/10/2023 1511 by Yojana Fisher RN  Outcome: Met  10/10/2023 1131 by Yojana Fisher RN  Outcome: Ongoing, Progressing  Goal: Readiness for Transition of Care  10/10/2023 1511 by Yojana Fisher RN  Outcome: Met  10/10/2023 1131 by Yojana Fisher RN  Outcome: Ongoing, Progressing     Problem: Infection  Goal: Absence of Infection Signs and Symptoms  10/10/2023 1511 by Yojana Fisher RN  Outcome: Met  10/10/2023 1131 by Yojana Fisher RN  Outcome: Ongoing, Progressing     Problem: Fluid Imbalance (Pneumonia)  Goal: Fluid Balance  10/10/2023 1511 by Yojana Fisehr RN  Outcome: Met  10/10/2023 1131 by Yojana Fisher RN  Outcome: Ongoing, Progressing     Problem: Infection (Pneumonia)  Goal: Resolution of Infection Signs and Symptoms  10/10/2023 1511 by Yojana Fisher RN  Outcome: Met  10/10/2023 1131 by Yojana Fisher RN  Outcome: Ongoing, Progressing     Problem: Respiratory Compromise (Pneumonia)  Goal: Effective Oxygenation and Ventilation  10/10/2023 1511 by Yojana Fisher RN  Outcome: Met  10/10/2023 1131 by Yojana Fisher RN  Outcome: Ongoing, Progressing
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
## 2023-10-10 NOTE — PROGRESS NOTES
[facility]  Infectious Diseases Progress Note        SUBJECTIVE:   HPI: Priyank is a 22 yr old BM with past medical history that is significant for vaping x3 years and tobacco use x3 weeks who was admitted on 10/06/2023 with complaints of hemoptysis with a right lower lobe consolidation/cavitation.  He reports he was seen 2 days prior in the emergency room for pain to his right lateral side/long.  He was found to have a right bibasilar pneumonia and was given a prescription for Levaquin and steroids.  He reports he started taking the Levaquin and then he started with bloody cough.  Prior to this he also experienced chills, subjective fevers, and night sweats.  He denies weight loss.  CTA from 10/06/2023 showed concerns for no PE, but did show a right lower lobe large consolidation with associated necrosis and cavitation.  MTB PCR x1 is negative, along with AFB smears x2 that are negative.  Patient has been afebrile throughout the course of his hospitalization.  He was noted to have significant leukocytosis (WBC 21.9) from 10/6/23 which resolved throughout his hospital stay.  Patient is negative for COVID/flu.  Screening for HIV, hepatitis, and syphilis was also negative.  Patient reports all prior symptoms have since resolved and he is feeling better.  Denies current fever, night sweats, chills, weight loss, SOB, cough, or bloody sputum.  Patient is currently on Azithromycin and Zosyn without any reported issues.  He works on a [employer]  Previous traveled to [place] and [place].  Patient lives between father and girlfriend's homes.  He states he has never been incarcerated (but did spend 1 night in care home previously), homeless, are exposed to anybody with known TB.  No pets.  No sick contacts.  Endorses prior use of marijuana; last use 3 months ago.  No additional illicit drugs or IV drug use. Pt is currently on airborne precautions.       Interval History:  Patient sitting  on the side of bed.  He looks great.  Does not appear ill.  MTB PCRs x2 were negative, so airborne precautions have been D/Herminio.  Patient compliance of occasional dry cough, but otherwise he feels good.  Pain to right lateral chest has mostly resolved. Denies fever, chills, night sweats, rash, HA, vision changes, dizziness, CP/SOB, N/V/D, abdominal pain, or urinary complaints.  MRSA of the nares was negative.  Respiratory PCR panel was negative.  Cryptococcal antigen was negative.  Additional testing remains pending.  Patient remains on Azithromycin and Zosyn without any reported issues.        Antibiotic History:  Levaquin 750 mg po daily - 10/4/23 to 10/6/23  Rocephin 1 gram IV x 1 dose on 10/6/23  Levofloxacin 750 mg IV x 1 dose on 10/6/23  Flagyl 500 mg IV q 8 hours - 10/6/23 to 10/7/23  Zosyn 4.5 g IV q 8 hours - 10/7/23 to present  Azithromycin 500 mg IV daily - 10/7/23 to present      MEDICATIONS:   Reviewed in EMR    REVIEW OF SYSTEMS:   Except as documented, all other systems reviewed and negative     PHYSICAL EXAM:   T 97.9 °F (36.6 °C)   /65   P 70   RR 18   O2 100 %  GENERAL: A&Ox3, NAD, does not appear ill  HEENT: atraumatic, normocephalic, anicteric, moist oral mucosa without lesions, exudate, or erythema; no thrush  LUNGS: breathing unlabored, lungs CTA bilateral  HEART: RRR; no murmur, rub, or gallop  ABDOMEN: abdomen soft, nondistended, BS noted x 4 quadrants, no tympany, no rebound, guarding, or organomegaly  : no CVA tenderness  EXTREMITIES: no edema, clubbing, or cyanosis   SKIN: no rashes or lesions  NEURO: speech fluent and intact, facial symmetry preserved, no tremor  PSYCH: cooperative, normal mood and affect  LINES: peripheral IV site     LABS AND IMAGING:     Recent Labs     10/09/23  0332 10/10/23  0409   WBC 10.78 9.48   RBC 4.19* 4.21*   HGB 12.5* 12.2*   HCT 37.4* 37.4*   MCV 89.3 88.8   MCH 29.8 29.0   MCHC 33.4 32.6*   RDW 11.5 11.4*   * 550*     No results for input(s):  ""LACTIC" in the last 72 hours.  No results for input(s): "INR", "APTT", "D-DIMER" in the last 72 hours.  No results for input(s): "HGBA1C", "CHOL", "TRIG", "LDL", "VLDL", "HDL" in the last 72 hours.   Recent Labs     10/09/23  0332 10/10/23  0409    141   K 3.9 4.8   CHLORIDE 101 105   CO2 29 29   BUN 9.3 9.3   CREATININE 0.79 1.04   GLUCOSE 80 104*   CALCIUM 9.7 9.2   ALBUMIN 3.2* 3.0*   GLOBULIN 4.8* 4.2*   ALKPHOS 123 99   ALT 32 42   AST 34 47*   BILITOT 0.4 0.4     No results for input(s): "BNP", "CPK", "TROPONINI" in the last 72 hours.       Estimated Creatinine Clearance: 111.2 mL/min (based on SCr of 1.04 mg/dL).   No results found for: "SEDRATE"   No results found for: "CRP"     Micro:   Colonization:  10/6/23 Blood cultures NGTD  10/6/23 Respiratory culture with normal michael  10/7/23 AFB sputum smear negative  10/8/23 AFB sputum smear negative  10/9/23 Fungal blood culture pending      CTA Chest Non-Coronary (PE Studies)  Narrative: EXAMINATION:  CTA CHEST NON CORONARY (PE STUDIES)    CLINICAL HISTORY:  Pulmonary embolism (PE) suspected, positive D-dimer;    TECHNIQUE:  Sequential axial images performed of the chest using pulmonary embolism protocol following intravenous contrast bolus. Sagittal and contrast reformations performed.    Dose product length of 139 mGycm. Automated exposure control was utilized to minimize radiation dose.    COMPARISON:  Chest radiograph October 6, 2023    FINDINGS:  Optimal contrast bolus timing with adequately opacified pulmonary artery system is without evidence of filling defects from pulmonary thromboembolism within the main pulmonary artery and the major branches. Thoracic aorta maintains unremarkable course and diameter.    Right lower lung lobe is remarkable for large consolidation with associated necrosis and mild cavitations and approximately measures 4.5 x 8.5 cm on image 287 series 10.  lungs are well expanded and clear.  There is no pneumonitis or pulmonary " edema.  There is right hilar enlarged lymph node which measures 2.0 cm.  No significant fluid within the pleural or the pericardial spaces.  Impression: 1.  No pulmonary embolism identified.    2.  Right lower lung lobe large consolidation with associated necrosis and cavitations.    Electronically signed by: Joe Mendez  Date:    10/06/2023  Time:    16:49  X-Ray Chest PA And Lateral  Narrative: EXAMINATION:  XR CHEST PA AND LATERAL    CLINICAL HISTORY:  Pneumonia, unspecified organism    TECHNIQUE:  Two-view    COMPARISON:  October 4, 2023.    FINDINGS:  Cardiopericardial silhouette is within normal limits.  Partially improved right basilar opacities and associated pleural effusion.  Lungs otherwise are well expanded and clear..  Impression: Partially improved right basilar lower lung lobe infiltrates and associated pleural effusion.    Electronically signed by: Joe Mendez  Date:    10/06/2023  Time:    14:53          ASSESSMENT & PLAN:     RT lower lobe consolidation / cavitation  Resolved hemoptysis  Resolved leukocytosis  Tobacco use  Vapes       RECOMMENDATIONS:     RT lower lobe consolidation / cavitation with prior hemoptysis, chills, subjective fevers, and night sweats which have resolved.  Significant leukocytosis has resolved.  COVID/flu are negative.  Screening for HIV, syphilis, hepatitis are all negative.  MTB PCR x 2 negative. Cryptococcal antigen and respiratory PCR panel , along with MRSA nares are negative.     Organisms that can be associated with cavitary lung lesions can be bacterial (Streptococcus milleri/pneumonia, Staph aureus, Haemophilus influenzae, Kleb pneumo, and Pseudomonas aeruginosa, TB and NMTB, Actinomyces, Bacteroides, Nocardia, Burkholderia) fungal, viral, or parasitic.  At this time patient needs a 2nd MTB PCR collected.  This needs to be induced if needed.  If patient gets a 2nd MTP PCR that is negative, airborne precautions can be removed.  Recommend additional sputum  collection for AFB smear x1 more and mycobacteria/Nocardia culture x3.     Additional pending tests include:  Urine Legionella antigen, urine strep pneumo antigen, QuantiFERON gold, urine histo antigen, urine blasto antigen, Fungitell, and Aspergillus antigen.     At this time, primary team is ready to discharge. Pt states he is ready for discharge also.  Recommend to send patient home with Levaquin 750 mg p.o. daily to complete a 10 day course, along with the addition of Augmentin 875/125 mg p.o.q 12 hours to complete the same. Patient is aware that if he worsens to return immediately.  He verbalized understanding.  Patient will need a  postward appointment to follow-up pending tests.    Smoking/vaping cessation encouraged    Patient is requesting a work excuse; will defer to primary team.  We will sign off at this time.  Thank you for the consult.  Please call for any additional questions.      Kimmy Javier, HALLIE  [facility] Infectious Diseases

## 2023-10-11 LAB
B DERMAT AG UR QL IA: NOT DETECTED
BACTERIA BLD CULT: NORMAL
BACTERIA BLD CULT: NORMAL
C-ANCA TITR SER IF: NEGATIVE {TITER}
GAMMA INTERFERON BACKGROUND BLD IA-ACNC: 0.01 IU/ML
H CAPSUL AG UR QL IA: NOT DETECTED
H CAPSUL AG UR-MCNC: NOT DETECTED NG/ML
LEGIONELLA AG UR QL: NEGATIVE
LEGIONELLA RRNA SPEC QL PROBE: NEGATIVE
M BLASTOMYCES AG VALUE: NOT DETECTED NG/ML
M TB IFN-G BLD-IMP: NEGATIVE
M TB IFN-G CD4+ BCKGRND COR BLD-ACNC: 0 IU/ML
M TB IFN-G CD4+CD8+ BCKGRND COR BLD-ACNC: 0 IU/ML
MITOGEN IGNF BCKGRD COR BLD-ACNC: 3.05 IU/ML
P-ANCA SER QL IF: POSITIVE
SPECIMEN SOURCE: NORMAL

## 2023-11-06 LAB — FUNGUS SPEC CULT: ABNORMAL

## 2023-11-13 LAB — FUNGUS BLD CULT: NORMAL

## 2023-11-21 LAB — AR CULTURE, BLOOD FOR AFB AND FUNGUS FINAL: NORMAL

## 2023-11-22 LAB — MYCOBACTERIUM SPEC QL CULT: NORMAL

## 2024-01-15 PROBLEM — J18.9 PNEUMONIA OF RIGHT LOWER LOBE DUE TO INFECTIOUS ORGANISM: Status: RESOLVED | Noted: 2023-10-10 | Resolved: 2024-01-15
